# Patient Record
Sex: FEMALE | Race: WHITE | Employment: PART TIME | URBAN - METROPOLITAN AREA
[De-identification: names, ages, dates, MRNs, and addresses within clinical notes are randomized per-mention and may not be internally consistent; named-entity substitution may affect disease eponyms.]

---

## 2017-11-13 ENCOUNTER — TELEPHONE (OUTPATIENT)
Dept: FAMILY MEDICINE | Facility: OTHER | Age: 52
End: 2017-11-13

## 2017-11-13 NOTE — TELEPHONE ENCOUNTER
To: Dr Velasquez  Patient would like to know if you would see her as a new patient.  She was speaking with Angie Atkins and she thought you might consider taking her.  Please return her call @ [phone].  Thank you

## 2017-11-13 NOTE — TELEPHONE ENCOUNTER
Unfortunately, I am not taking new patients at this time.  [facility] are still taking new patients.

## 2017-11-16 ENCOUNTER — TRANSFERRED RECORDS (OUTPATIENT)
Dept: HEALTH INFORMATION MANAGEMENT | Facility: HOSPITAL | Age: 52
End: 2017-11-16

## 2017-11-29 ENCOUNTER — OFFICE VISIT (OUTPATIENT)
Dept: FAMILY MEDICINE | Facility: OTHER | Age: 52
End: 2017-11-29
Attending: NURSE PRACTITIONER
Payer: COMMERCIAL

## 2017-11-29 VITALS
RESPIRATION RATE: 18 BRPM | BODY MASS INDEX: 30.16 KG/M2 | SYSTOLIC BLOOD PRESSURE: 112 MMHG | HEART RATE: 65 BPM | OXYGEN SATURATION: 99 % | TEMPERATURE: 96.9 F | WEIGHT: 181 LBS | DIASTOLIC BLOOD PRESSURE: 76 MMHG | HEIGHT: 65 IN

## 2017-11-29 DIAGNOSIS — M54.50 MIDLINE LOW BACK PAIN WITHOUT SCIATICA, UNSPECIFIED CHRONICITY: ICD-10-CM

## 2017-11-29 DIAGNOSIS — R63.5 WEIGHT GAIN: ICD-10-CM

## 2017-11-29 DIAGNOSIS — Z76.89 ENCOUNTER TO ESTABLISH CARE: Primary | ICD-10-CM

## 2017-11-29 DIAGNOSIS — Z11.59 NEED FOR HEPATITIS C SCREENING TEST: ICD-10-CM

## 2017-11-29 DIAGNOSIS — Z23 NEED FOR VACCINATION: ICD-10-CM

## 2017-11-29 LAB
ANION GAP SERPL CALCULATED.3IONS-SCNC: 12 MMOL/L (ref 3–14)
BUN SERPL-MCNC: 12 MG/DL (ref 7–30)
CALCIUM SERPL-MCNC: 9.2 MG/DL (ref 8.5–10.1)
CHLORIDE SERPL-SCNC: 106 MMOL/L (ref 94–109)
CHOLEST SERPL-MCNC: 222 MG/DL
CO2 SERPL-SCNC: 22 MMOL/L (ref 20–32)
CREAT SERPL-MCNC: 0.58 MG/DL (ref 0.52–1.04)
GFR SERPL CREATININE-BSD FRML MDRD: >90 ML/MIN/1.7M2
GLUCOSE SERPL-MCNC: 85 MG/DL (ref 70–99)
HDLC SERPL-MCNC: 52 MG/DL
LDLC SERPL CALC-MCNC: 143 MG/DL
NONHDLC SERPL-MCNC: 170 MG/DL
POTASSIUM SERPL-SCNC: 4 MMOL/L (ref 3.4–5.3)
SODIUM SERPL-SCNC: 140 MMOL/L (ref 133–144)
TRIGL SERPL-MCNC: 135 MG/DL
TSH SERPL DL<=0.005 MIU/L-ACNC: 1.27 MU/L (ref 0.4–4)

## 2017-11-29 PROCEDURE — 36415 COLL VENOUS BLD VENIPUNCTURE: CPT | Performed by: NURSE PRACTITIONER

## 2017-11-29 PROCEDURE — 99000 SPECIMEN HANDLING OFFICE-LAB: CPT | Performed by: NURSE PRACTITIONER

## 2017-11-29 PROCEDURE — 90471 IMMUNIZATION ADMIN: CPT | Performed by: NURSE PRACTITIONER

## 2017-11-29 PROCEDURE — 86803 HEPATITIS C AB TEST: CPT | Mod: 90 | Performed by: NURSE PRACTITIONER

## 2017-11-29 PROCEDURE — 90715 TDAP VACCINE 7 YRS/> IM: CPT | Performed by: NURSE PRACTITIONER

## 2017-11-29 PROCEDURE — 80061 LIPID PANEL: CPT | Performed by: NURSE PRACTITIONER

## 2017-11-29 PROCEDURE — 84443 ASSAY THYROID STIM HORMONE: CPT | Performed by: NURSE PRACTITIONER

## 2017-11-29 PROCEDURE — 99203 OFFICE O/P NEW LOW 30 MIN: CPT | Mod: 25 | Performed by: NURSE PRACTITIONER

## 2017-11-29 PROCEDURE — 80048 BASIC METABOLIC PNL TOTAL CA: CPT | Performed by: NURSE PRACTITIONER

## 2017-11-29 RX ORDER — BACLOFEN 10 MG/1
5-10 TABLET ORAL 3 TIMES DAILY
Qty: 90 TABLET | Refills: 1 | Status: SHIPPED | OUTPATIENT
Start: 2017-11-29 | End: 2018-01-08

## 2017-11-29 ASSESSMENT — ANXIETY QUESTIONNAIRES
4. TROUBLE RELAXING: NOT AT ALL
7. FEELING AFRAID AS IF SOMETHING AWFUL MIGHT HAPPEN: NOT AT ALL
6. BECOMING EASILY ANNOYED OR IRRITABLE: SEVERAL DAYS
1. FEELING NERVOUS, ANXIOUS, OR ON EDGE: NOT AT ALL
2. NOT BEING ABLE TO STOP OR CONTROL WORRYING: NOT AT ALL
IF YOU CHECKED OFF ANY PROBLEMS ON THIS QUESTIONNAIRE, HOW DIFFICULT HAVE THESE PROBLEMS MADE IT FOR YOU TO DO YOUR WORK, TAKE CARE OF THINGS AT HOME, OR GET ALONG WITH OTHER PEOPLE: NOT DIFFICULT AT ALL
GAD7 TOTAL SCORE: 2
5. BEING SO RESTLESS THAT IT IS HARD TO SIT STILL: NOT AT ALL
3. WORRYING TOO MUCH ABOUT DIFFERENT THINGS: SEVERAL DAYS

## 2017-11-29 ASSESSMENT — PATIENT HEALTH QUESTIONNAIRE - PHQ9: SUM OF ALL RESPONSES TO PHQ QUESTIONS 1-9: 0

## 2017-11-29 ASSESSMENT — PAIN SCALES - GENERAL: PAINLEVEL: NO PAIN (0)

## 2017-11-29 NOTE — NURSING NOTE
"Chief Complaint   Patient presents with     Establish Care       Initial /76 (BP Location: Left arm, Patient Position: Chair, Cuff Size: Adult Large)  Pulse 65  Temp 96.9  F (36.1  C) (Tympanic)  Resp 18  Ht 5' 4.5\" (1.638 m)  Wt 181 lb (82.1 kg)  SpO2 99%  BMI 30.59 kg/m2 Estimated body mass index is 30.59 kg/(m^2) as calculated from the following:    Height as of this encounter: 5' 4.5\" (1.638 m).    Weight as of this encounter: 181 lb (82.1 kg).  Medication Reconciliation: complete   Pamela M Lechevalier LPN      "

## 2017-11-29 NOTE — PROGRESS NOTES
SUBJECTIVE:   Maria A Saldaña is a 52 year old female who presents to clinic today for the following health issues:  Chief Complaint   Patient presents with     Establish Care     Maria A presents today to [facility] care.  She had been following with Dr Alvarez.      She had her mammogram earlier this fall, pap is due next year and denies history of abnormal pap.      She is overall healthy and does not have any new concerns other than intermittent low back pain.  She takes NSAIDS with some relief but does notice pain with standing for long periods, or sitting for long periods.      She believes she is starting menopause, periods have become irregular, weight gain of 20 in the past 2 years.        Problem list and histories reviewed & adjusted, as indicated.  Additional history: as documented    There is no problem list on file for this patient.    Past Surgical History:   Procedure Laterality Date     GYN SURGERY  1989    c section     SOFT TISSUE SURGERY      bunnon on foot       Social History   Substance Use Topics     Smoking status: Never Smoker     Smokeless tobacco: Never Used     Alcohol use Not on file      Comment: 1 drink occasonaly      Family History   Problem Relation Age of Onset     Hypertension Mother      Hyperlipidemia Mother      Coronary Artery Disease Mother      Hypertension Father      Hyperlipidemia Father      Hypertension Brother      Hypertension Brother      Hypertension Brother          No current outpatient prescriptions on file.     Allergies   Allergen Reactions     Sulfa Drugs Swelling     throat     No lab results found.   BP Readings from Last 3 Encounters:   11/29/17 112/76    Wt Readings from Last 3 Encounters:   11/29/17 181 lb (82.1 kg)                Reviewed and updated as needed this visit by clinical staffTobacco  Allergies  Meds  Problems  Med Hx  Surg Hx  Fam Hx  Soc Hx        Reviewed and updated as needed this visit by Provider         ROS:  Constitutional,  "HEENT, cardiovascular, pulmonary, gi and gu systems are negative, except as otherwise noted.      OBJECTIVE:   /76 (BP Location: Left arm, Patient Position: Chair, Cuff Size: Adult Large)  Pulse 65  Temp 96.9  F (36.1  C) (Tympanic)  Resp 18  Ht 5' 4.5\" (1.638 m)  Wt 181 lb (82.1 kg)  SpO2 99%  BMI 30.59 kg/m2  Body mass index is 30.59 kg/(m^2).  GENERAL: healthy, alert and no distress  HENT: ear canals and TM's normal, nose and mouth without ulcers or lesions  NECK: no adenopathy, no asymmetry, masses, or scars, thyroid normal to palpation and no carotid bruits  RESP: lungs clear to auscultation - no rales, rhonchi or wheezes  CV: regular rate and rhythm, normal S1 S2, no S3 or S4, no murmur, click or rub, no peripheral edema and peripheral pulses strong  MS: no gross musculoskeletal defects noted, no edema  PSYCH: mentation appears normal, affect normal/bright      ASSESSMENT/PLAN:       1. Encounter to establish care    2. Weight gain  symptomatic  - TSH with free T4 reflex  - Basic metabolic panel  - Lipid Profile    3. Midline low back pain without sciatica, unspecified chronicity  Continue ibuprofen or naproxen as needed  - baclofen (LIORESAL) 10 MG tablet; Take 0.5-1 tablets (5-10 mg) by mouth 3 times daily  Dispense: 90 tablet; Refill: 1    4. Need for hepatitis C screening test  routine  - Hepatitis C Screen Reflex to HCV RNA Quant and Genotype    Declined flu vaccine  Updated adacel daily    FUTURE APPOINTMENTS:       - Follow-up visit as needed    Deanna Shine NP  [facility]    "

## 2017-11-29 NOTE — MR AVS SNAPSHOT
After Visit Summary   11/29/2017    Maria A Saldaña    MRN: [id number]           Patient Information     Date Of Birth          1965        Visit Information        Provider Department      11/29/2017 9:00 AM Deanna Shine NP [facility]        Today's Diagnoses     Encounter to establish care    -  1    Weight gain        Midline low back pain without sciatica, unspecified chronicity        Need for hepatitis C screening test        Need for vaccination          Care Instructions      ASSESSMENT/PLAN:       1. Encounter to establish care    2. Weight gain  symptomatic  - TSH with free T4 reflex  - Basic metabolic panel  - Lipid Profile    3. Midline low back pain without sciatica, unspecified chronicity  Continue ibuprofen or naproxen as needed  - baclofen (LIORESAL) 10 MG tablet; Take 0.5-1 tablets (5-10 mg) by mouth 3 times daily  Dispense: 90 tablet; Refill: 1    4. Need for hepatitis C screening test  routine  - Hepatitis C Screen Reflex to HCV RNA Quant and Genotype    Declined flu vaccine  Updated boostrix daily    FUTURE APPOINTMENTS:       - Follow-up visit as needed    Deanna Shine NP  [facility]              Follow-ups after your visit        Follow-up notes from your care team     Return if symptoms worsen or fail to improve.      Your next 10 appointments already scheduled     Jan 08, 2018  9:00 AM CST   (Arrive by 8:45 AM)   Pre-Op physical with Deanna Shine NP   [facility] ([facility] )    [address]  [place] [address]   [phone]              Who to contact     If you have questions or need follow up information about today's clinic visit or your schedule please contact [facility] directly at [phone].  Normal or non-critical lab and imaging results will be communicated to you by MyChart, letter or phone within 4 business days after the clinic  "has received the results. If you do not hear from us within 7 days, please contact the clinic through [employer] or phone. If you have a critical or abnormal lab result, we will notify you by phone as soon as possible.  Submit refill requests through [employer] or call your pharmacy and they will forward the refill request to us. Please allow 3 business days for your refill to be completed.          Additional Information About Your Visit        [employer] Information     [employer] lets you send messages to your doctor, view your test results, renew your prescriptions, schedule appointments and more. To sign up, go to [email or web address] . Click on \"Log in\" on the left side of the screen, which will take you to the Welcome page. Then click on \"Sign up Now\" on the right side of the page.     You will be asked to enter the access code listed below, as well as some personal information. Please follow the directions to create your username and password.     Your access code is: [email or web address]  Expires: 2018  2:18 PM     Your access code will  in 90 days. If you need help or a new code, please call your [facility] clinic or [phone].        Care EveryWhere ID     This is your Care EveryWhere ID. This could be used by other organizations to access your [facility] medical records  [id number]        Your Vitals Were     Pulse Temperature Respirations Height Pulse Oximetry BMI (Body Mass Index)    65 96.9  F (36.1  C) (Tympanic) 18 5' 4.5\" (1.638 m) 99% 30.59 kg/m2       Blood Pressure from Last 3 Encounters:   17 112/76    Weight from Last 3 Encounters:   17 181 lb (82.1 kg)              We Performed the Following     1st  Administration  [10379]     Basic metabolic panel     Hepatitis C Screen Reflex to HCV RNA Quant and Genotype     Lipid Profile     TDAP VACCINE (ADACEL) [14348.002]     TSH with free T4 reflex          Today's Medication Changes          These changes are accurate as of: 17  2:18 " PM.  If you have any questions, ask your nurse or doctor.               Start taking these medicines.        Dose/Directions    baclofen 10 MG tablet   Commonly known as:  LIORESAL   Used for:  Midline low back pain without sciatica, unspecified chronicity   Started by:  Deanna Shine NP        Dose:  5-10 mg   Take 0.5-1 tablets (5-10 mg) by mouth 3 times daily   Quantity:  90 tablet   Refills:  1            Where to get your medicines      These medications were sent to [place] [address] [place] [address] [address]     Phone:  [phone]     baclofen 10 MG tablet                Primary Care Provider Office Phone # Fax #    Deanna Shine [phone] [address] [facility] [address]        Equal Access to Services     [facility] : Hadii ayesha vivas hadasho Soomaali, waaxda luqadaha, qaybta kaalmada adeegyada, lakesha mehta . So [facility] [phone].    ATENCIÓN: Si habla español, tiene a lopez disposición servicios gratuitos de asistencia lingüística. Llame al [phone].    We comply with applicable federal civil rights laws and [place] laws. We do not discriminate on the basis of race, color, national origin, age, disability, sex, sexual orientation, or gender identity.            Thank you!     Thank you for choosing [facility]  for your care. Our goal is always to provide you with excellent care. Hearing back from our patients is one way we can continue to improve our services. Please take a few minutes to complete the written survey that you may receive in the mail after your visit with us. Thank you!             Your Updated Medication List - Protect others around you: Learn how to safely use, store and throw away your medicines at www.disposemymeds.org.          This list is accurate as of: 11/29/17  2:18 PM.  Always use your most recent med list.                    Brand Name Dispense Instructions for use Diagnosis    baclofen 10 MG tablet    LIORESAL    90 tablet    Take 0.5-1 tablets (5-10 mg) by mouth 3 times daily    Midline low back pain without sciatica, unspecified chronicity

## 2017-11-30 LAB — HCV AB SERPL QL IA: NONREACTIVE

## 2017-11-30 ASSESSMENT — ANXIETY QUESTIONNAIRES: GAD7 TOTAL SCORE: 2

## 2018-01-08 ENCOUNTER — OFFICE VISIT (OUTPATIENT)
Dept: FAMILY MEDICINE | Facility: OTHER | Age: 53
End: 2018-01-08
Attending: NURSE PRACTITIONER
Payer: COMMERCIAL

## 2018-01-08 VITALS
HEART RATE: 71 BPM | WEIGHT: 181 LBS | SYSTOLIC BLOOD PRESSURE: 106 MMHG | TEMPERATURE: 97.7 F | RESPIRATION RATE: 16 BRPM | BODY MASS INDEX: 30.16 KG/M2 | HEIGHT: 65 IN | DIASTOLIC BLOOD PRESSURE: 74 MMHG | OXYGEN SATURATION: 98 %

## 2018-01-08 DIAGNOSIS — M21.612 BUNION, LEFT: ICD-10-CM

## 2018-01-08 DIAGNOSIS — Z01.818 PREOP GENERAL PHYSICAL EXAM: Primary | ICD-10-CM

## 2018-01-08 DIAGNOSIS — Z00.00 HEALTH CARE MAINTENANCE: ICD-10-CM

## 2018-01-08 LAB
ALBUMIN SERPL-MCNC: 3.7 G/DL (ref 3.4–5)
ALP SERPL-CCNC: 89 U/L (ref 40–150)
ALT SERPL W P-5'-P-CCNC: 27 U/L (ref 0–50)
ANION GAP SERPL CALCULATED.3IONS-SCNC: 8 MMOL/L (ref 3–14)
AST SERPL W P-5'-P-CCNC: 17 U/L (ref 0–45)
BASOPHILS # BLD AUTO: 0 10E9/L (ref 0–0.2)
BASOPHILS NFR BLD AUTO: 0.2 %
BILIRUB SERPL-MCNC: 0.3 MG/DL (ref 0.2–1.3)
BUN SERPL-MCNC: 15 MG/DL (ref 7–30)
CALCIUM SERPL-MCNC: 9.1 MG/DL (ref 8.5–10.1)
CHLORIDE SERPL-SCNC: 106 MMOL/L (ref 94–109)
CO2 SERPL-SCNC: 27 MMOL/L (ref 20–32)
CREAT SERPL-MCNC: 0.66 MG/DL (ref 0.52–1.04)
DIFFERENTIAL METHOD BLD: NORMAL
EOSINOPHIL # BLD AUTO: 0.2 10E9/L (ref 0–0.7)
EOSINOPHIL NFR BLD AUTO: 1.9 %
ERYTHROCYTE [DISTWIDTH] IN BLOOD BY AUTOMATED COUNT: 12.4 % (ref 10–15)
GFR SERPL CREATININE-BSD FRML MDRD: >90 ML/MIN/1.7M2
GLUCOSE SERPL-MCNC: 82 MG/DL (ref 70–99)
HCT VFR BLD AUTO: 39.5 % (ref 35–47)
HGB BLD-MCNC: 13.7 G/DL (ref 11.7–15.7)
LYMPHOCYTES # BLD AUTO: 2.8 10E9/L (ref 0.8–5.3)
LYMPHOCYTES NFR BLD AUTO: 31.8 %
MCH RBC QN AUTO: 31 PG (ref 26.5–33)
MCHC RBC AUTO-ENTMCNC: 34.7 G/DL (ref 31.5–36.5)
MCV RBC AUTO: 89 FL (ref 78–100)
MONOCYTES # BLD AUTO: 0.7 10E9/L (ref 0–1.3)
MONOCYTES NFR BLD AUTO: 7.3 %
NEUTROPHILS # BLD AUTO: 5.3 10E9/L (ref 1.6–8.3)
NEUTROPHILS NFR BLD AUTO: 58.8 %
PLATELET # BLD AUTO: 362 10E9/L (ref 150–450)
POTASSIUM SERPL-SCNC: 4.3 MMOL/L (ref 3.4–5.3)
PROT SERPL-MCNC: 7.4 G/DL (ref 6.8–8.8)
RBC # BLD AUTO: 4.42 10E12/L (ref 3.8–5.2)
SODIUM SERPL-SCNC: 141 MMOL/L (ref 133–144)
WBC # BLD AUTO: 8.9 10E9/L (ref 4–11)

## 2018-01-08 PROCEDURE — 36415 COLL VENOUS BLD VENIPUNCTURE: CPT | Performed by: NURSE PRACTITIONER

## 2018-01-08 PROCEDURE — 93000 ELECTROCARDIOGRAM COMPLETE: CPT | Performed by: INTERNAL MEDICINE

## 2018-01-08 PROCEDURE — 99214 OFFICE O/P EST MOD 30 MIN: CPT | Performed by: NURSE PRACTITIONER

## 2018-01-08 PROCEDURE — 80053 COMPREHEN METABOLIC PANEL: CPT | Performed by: NURSE PRACTITIONER

## 2018-01-08 PROCEDURE — 85025 COMPLETE CBC W/AUTO DIFF WBC: CPT | Performed by: NURSE PRACTITIONER

## 2018-01-08 RX ORDER — CRANBERRY FRUIT EXTRACT 200 MG
1 CAPSULE ORAL DAILY
Qty: 90 CAPSULE | Refills: 3 | COMMUNITY
Start: 2018-01-08 | End: 2018-11-30

## 2018-01-08 RX ORDER — OMEGA-3 FATTY ACIDS/FISH OIL 300-1000MG
1 CAPSULE ORAL DAILY
Qty: 90 CAPSULE | Refills: 3 | COMMUNITY
Start: 2018-01-08 | End: 2018-11-30

## 2018-01-08 RX ORDER — MULTIPLE VITAMINS W/ MINERALS TAB 9MG-400MCG
1 TAB ORAL DAILY
Qty: 100 TABLET | Refills: 3 | COMMUNITY
Start: 2018-01-08 | End: 2018-11-30

## 2018-01-08 ASSESSMENT — PATIENT HEALTH QUESTIONNAIRE - PHQ9: SUM OF ALL RESPONSES TO PHQ QUESTIONS 1-9: 0

## 2018-01-08 ASSESSMENT — ANXIETY QUESTIONNAIRES
7. FEELING AFRAID AS IF SOMETHING AWFUL MIGHT HAPPEN: NOT AT ALL
6. BECOMING EASILY ANNOYED OR IRRITABLE: NOT AT ALL
1. FEELING NERVOUS, ANXIOUS, OR ON EDGE: NOT AT ALL
GAD7 TOTAL SCORE: 0
2. NOT BEING ABLE TO STOP OR CONTROL WORRYING: NOT AT ALL
IF YOU CHECKED OFF ANY PROBLEMS ON THIS QUESTIONNAIRE, HOW DIFFICULT HAVE THESE PROBLEMS MADE IT FOR YOU TO DO YOUR WORK, TAKE CARE OF THINGS AT HOME, OR GET ALONG WITH OTHER PEOPLE: NOT DIFFICULT AT ALL
5. BEING SO RESTLESS THAT IT IS HARD TO SIT STILL: NOT AT ALL
3. WORRYING TOO MUCH ABOUT DIFFERENT THINGS: NOT AT ALL
4. TROUBLE RELAXING: NOT AT ALL

## 2018-01-08 ASSESSMENT — PAIN SCALES - GENERAL: PAINLEVEL: NO PAIN (0)

## 2018-01-08 NOTE — NURSING NOTE
"Chief Complaint   Patient presents with     Pre-Op Exam     bunion left foot [facility] [place]       Initial /74 (BP Location: Right arm, Patient Position: Chair, Cuff Size: Adult Large)  Pulse 71  Temp 97.7  F (36.5  C) (Tympanic)  Resp 16  Ht 5' 4.5\" (1.638 m)  Wt 181 lb (82.1 kg)  SpO2 98%  BMI 30.59 kg/m2 Estimated body mass index is 30.59 kg/(m^2) as calculated from the following:    Height as of this encounter: 5' 4.5\" (1.638 m).    Weight as of this encounter: 181 lb (82.1 kg).  Medication Reconciliation: complete   Pamela M Lechevalier LPN      "

## 2018-01-08 NOTE — PROGRESS NOTES
[facility]  [address]  [facility] [address]  [phone]  Dept: [phone]    PRE-OP EVALUATION:  Today's date: 2018    Maria A Saldaña (: 1965) presents for pre-operative evaluation assessment as requested by Dr. Barrera Messina.  She requires evaluation and anesthesia risk assessment prior to undergoing surgery/procedure for treatment of left foot pain due to bunion.    Proposed procedure: Bunion removal left foot     Date of Surgery/ Procedure: 17  Time of Surgery/ Procedure: to be determined   Hospital/Surgical Facility: [place], [place]     Primary Physician: Deanna Shine  Type of Anesthesia Anticipated: to be determined    Patient has a Health Care Directive or Living Will:  NO    1. NO - Do you have a history of heart attack, stroke, stent, bypass or surgery on an artery in the head, neck, heart or legs?  2. NO - Do you ever have any pain or discomfort in your chest?  3. NO - Do you have a history of  Heart Failure?  4. NO - Are you troubled by shortness of breath when: walking on the level, up a slight hill or at night?  5. NO - Do you currently have a cold, bronchitis or other respiratory infection?  6. NO - Do you have a cough, shortness of breath or wheezing?  7. NO - Do you sometimes get pains in the calves of your legs when you walk?  8. YES - Do you or anyone in your family have previous history of blood clots? Father with factor 5; Maria A has been tested and she is negative.    9. NO - Do you or does anyone in your family have a serious bleeding problem such as prolonged bleeding following surgeries or cuts?  10. NO - Have you ever had problems with anemia or been told to take iron pills?  11. NO - Have you had any abnormal blood loss such as black, tarry or bloody stools, or abnormal vaginal bleeding?  12. NO - Have you ever had a blood transfusion?  13. YES - Have you or any of your relatives ever had problems with  anesthesia? Mother, several years ago.    14. NO - Do you have sleep apnea, excessive snoring or daytime drowsiness?  15. NO - Do you have any prosthetic heart valves?  16. NO - Do you have prosthetic joints?  17. NO - Is there any chance that you may be pregnant?        HPI:                                                      Brief HPI related to upcoming procedure: chronic left foot pain due to bunion.  Symptoms are now interfering with daily activities.  The decision has been made to proceed with surgical correction.        She is otherwise feeling well and does not have any new concerns today.      MEDICAL HISTORY:                                                    There are no active problems to display for this patient.     No past medical history on file.  Past Surgical History:   Procedure Laterality Date     COLONOSCOPY - HIM SCAN  04/13/2012     GYN SURGERY  1989    c section     SOFT TISSUE SURGERY      bunnon on foot     Current Outpatient Prescriptions   Medication Sig Dispense Refill     multivitamin, therapeutic with minerals (MULTI-VITAMIN) TABS tablet Take 1 tablet by mouth daily 100 tablet 3     magnesium oxide (MAG-OX) 400 (241.3 MG) MG tablet Take 1 tablet (400 mg) by mouth daily 60 tablet 3     Probiotic Product (ACIDOPHILUS PROBIOTIC BLEND) CAPS Take 1 each by mouth daily 90 capsule 3     omega 3 1000 MG CAPS Take 1 g by mouth daily 90 capsule 3     OTC products: None, except as noted above, no recent use of OTC ASA, NSAIDS or Steroids, no use of herbal medications or other supplements and herbals and vitamins - she does use Plexus vitamins.      Allergies   Allergen Reactions     Sulfa Drugs Swelling     throat      Latex Allergy: NO    Social History   Substance Use Topics     Smoking status: Never Smoker     Smokeless tobacco: Never Used     Alcohol use Not on file      Comment: 1 drink occasonaly      History   Drug Use No       REVIEW OF SYSTEMS:                                             "        C: NEGATIVE for fever, chills, change in weight  I: NEGATIVE for worrisome rashes, moles or lesions  E: NEGATIVE for vision changes or irritation  E/M: NEGATIVE for ear, mouth and throat problems  R: NEGATIVE for significant cough or SOB  CV: NEGATIVE for chest pain, palpitations or peripheral edema  GI: NEGATIVE for nausea, abdominal pain, heartburn, or change in bowel habits  : NEGATIVE for frequency, dysuria, or hematuria  MUSCULOSKELETAL:left foot pain  N: NEGATIVE for weakness, dizziness or paresthesias  E: NEGATIVE for temperature intolerance, skin/hair changes  H: NEGATIVE for bleeding problems  P: NEGATIVE for changes in mood or affect    EXAM:                                                    /74 (BP Location: Right arm, Patient Position: Chair, Cuff Size: Adult Large)  Pulse 71  Temp 97.7  F (36.5  C) (Tympanic)  Resp 16  Ht 5' 4.5\" (1.638 m)  Wt 181 lb (82.1 kg)  SpO2 98%  BMI 30.59 kg/m2    GENERAL APPEARANCE: healthy, alert and no distress     EYES: EOMI, PERRL     HENT: ear canals and TM's normal and nose and mouth without ulcers or lesions     NECK: no adenopathy, no asymmetry, masses, or scars and thyroid normal to palpation     RESP: lungs clear to auscultation - no rales, rhonchi or wheezes     CV: regular rates and rhythm, normal S1 S2, no S3 or S4 and no murmur, click or rub     ABDOMEN:  soft, nontender, no HSM or masses and bowel sounds normal     MS: extremities normal- no gross deformities noted, no evidence of inflammation in joints, FROM in all extremities.     SKIN: no suspicious lesions or rashes     NEURO: Normal strength and tone, sensory exam grossly normal, mentation intact and speech normal     PSYCH: mentation appears normal. and affect normal/bright    DIAGNOSTICS:                                                      Results for orders placed or performed in visit on 01/08/18   CBC with platelets differential   Result Value Ref Range    WBC 8.9 4.0 - 11.0 " 10e9/L    RBC Count 4.42 3.8 - 5.2 10e12/L    Hemoglobin 13.7 11.7 - 15.7 g/dL    Hematocrit 39.5 35.0 - 47.0 %    MCV 89 78 - 100 fl    MCH 31.0 26.5 - 33.0 pg    MCHC 34.7 31.5 - 36.5 g/dL    RDW 12.4 10.0 - 15.0 %    Platelet Count 362 150 - 450 10e9/L    Diff Method Automated Method     % Neutrophils 58.8 %    % Lymphocytes 31.8 %    % Monocytes 7.3 %    % Eosinophils 1.9 %    % Basophils 0.2 %    Absolute Neutrophil 5.3 1.6 - 8.3 10e9/L    Absolute Lymphocytes 2.8 0.8 - 5.3 10e9/L    Absolute Monocytes 0.7 0.0 - 1.3 10e9/L    Absolute Eosinophils 0.2 0.0 - 0.7 10e9/L    Absolute Basophils 0.0 0.0 - 0.2 10e9/L   Comprehensive metabolic panel   Result Value Ref Range    Sodium 141 133 - 144 mmol/L    Potassium 4.3 3.4 - 5.3 mmol/L    Chloride 106 94 - 109 mmol/L    Carbon Dioxide 27 20 - 32 mmol/L    Anion Gap 8 3 - 14 mmol/L    Glucose 82 70 - 99 mg/dL    Urea Nitrogen 15 7 - 30 mg/dL    Creatinine 0.66 0.52 - 1.04 mg/dL    GFR Estimate >90 >60 mL/min/1.7m2    GFR Estimate If Black >90 >60 mL/min/1.7m2    Calcium 9.1 8.5 - 10.1 mg/dL    Bilirubin Total 0.3 0.2 - 1.3 mg/dL    Albumin 3.7 3.4 - 5.0 g/dL    Protein Total 7.4 6.8 - 8.8 g/dL    Alkaline Phosphatase 89 40 - 150 U/L    ALT 27 0 - 50 U/L    AST 17 0 - 45 U/L         Recent Labs   Lab Test  11/29/17   0959   NA  140   POTASSIUM  4.0   CR  0.58           EKG Interpretation:      Interpreted by Deanna Shine    Symptoms at time of EKG: None   Rhythm: Normal sinus   Rate: Normal, 63  Axis: Normal  Ectopy: None  Conduction: Normal  ST Segments/ T Waves: No ST-T wave changes and No acute ischemic changes  Q Waves: None  Comparison to prior: No old EKG available    Clinical Impression: normal EKG      IMPRESSION:                                                    Reason for surgery/procedure: lft foot pain due to bunion  Diagnosis/reason for consult: anesthesia risk assessment    The proposed surgical procedure is considered INTERMEDIATE  risk.    REVISED CARDIAC RISK INDEX  The patient has the following serious cardiovascular risks for perioperative complications such as (MI, PE, VFib and 3  AV Block):  No serious cardiac risks  INTERPRETATION: 0 risks: Class I (very low risk - 0.4% complication rate)    The patient has the following additional risks for perioperative complications:  No identified additional risks      ICD-10-CM    1. Preop general physical exam Z01.818 CBC with platelets differential     Comprehensive metabolic panel     EKG 12-lead complete w/read - (Clinic Performed)   2. Bunion, left M21.612    3. Health care maintenance Z00.00 multivitamin, therapeutic with minerals (MULTI-VITAMIN) TABS tablet     magnesium oxide (MAG-OX) 400 (241.3 MG) MG tablet     Probiotic Product (ACIDOPHILUS PROBIOTIC BLEND) CAPS     omega 3 1000 MG CAPS       RECOMMENDATIONS:                                                        Cardiovascular Risk  Performs 4 METs exercise without symptoms (Climb a flight of stairs and Walk on level ground at 15 minutes per mile (4 miles/hour)) .       APPROVAL GIVEN to proceed with proposed procedure, without further diagnostic evaluation       Signed Electronically by: Deanna Shine NP    Copy of this evaluation report is provided to requesting physician.    [facility] Preop Guidelines

## 2018-01-08 NOTE — MR AVS SNAPSHOT
After Visit Summary   1/8/2018    Maria A Saldaña    MRN: [id number]           Patient Information     Date Of Birth          1965        Visit Information        Provider Department      1/8/2018 9:00 AM Deanna Shine NP [facility]        Today's Diagnoses     Preop general physical exam    -  1    [place] left        [facility]          Care Instructions      Before Your Surgery      Call your surgeon if there is any change in your health. This includes signs of a cold or flu (such as a sore throat, runny nose, cough, rash or fever).    Do not smoke, drink alcohol or take over the counter medicine (unless your surgeon or primary care doctor tells you to) for the 24 hours before and after surgery.    If you take prescribed drugs: Follow your doctor s orders about which medicines to take and which to stop until after surgery.    Eating and drinking prior to surgery: follow the instructions from your surgeon    Take a shower or bath the night before surgery. Use the soap your surgeon gave you to gently clean your skin. If you do not have soap from your surgeon, use your regular soap. Do not shave or scrub the surgery site.  Wear clean pajamas and have clean sheets on your bed.           Follow-ups after your visit        Who to contact     If you have questions or need follow up information about today's clinic visit or your schedule please contact [facility] directly at [phone].  Normal or non-critical lab and imaging results will be communicated to you by MyChart, letter or phone within 4 business days after the clinic has received the results. If you do not hear from us within 7 days, please contact the clinic through MyChart or phone. If you have a critical or abnormal lab result, we will notify you by phone as soon as possible.  Submit refill requests through [employer] or call your pharmacy and they will forward the refill request to us.  "Please allow 3 business days for your refill to be completed.          Additional Information About Your Visit        MyChart Information     [employer] lets you send messages to your doctor, view your test results, renew your prescriptions, schedule appointments and more. To sign up, go to [email or web address] . Click on \"Log in\" on the left side of the screen, which will take you to the Welcome page. Then click on \"Sign up Now\" on the right side of the page.     You will be asked to enter the access code listed below, as well as some personal information. Please follow the directions to create your username and password.     Your access code is: [email or web address]  Expires: 2018  2:18 PM     Your access code will  in 90 days. If you need help or a new code, please call your [facility] clinic or [phone].        Care EveryWhere ID     This is your [facility] EveryWhere ID. This could be used by other organizations to access your [facility] medical records  [id number]        Your Vitals Were     Pulse Temperature Respirations Height Pulse Oximetry BMI (Body Mass Index)    71 97.7  F (36.5  C) (Tympanic) 16 5' 4.5\" (1.638 m) 98% 30.59 kg/m2       Blood Pressure from Last 3 Encounters:   18 106/74   17 112/76    Weight from Last 3 Encounters:   18 181 lb (82.1 kg)   17 181 lb (82.1 kg)              We Performed the Following     CBC with platelets differential     Comprehensive metabolic panel     EKG 12-lead complete w/read - (Clinic Performed)        Primary Care Provider Office Phone # Fax #    Deanna KJ Shine [phone] [address] [place] [address]        Equal Access to Services     ELVIA ARGUETA : Alina Reed, rosa kirkpatrick, qalakesha hernandez. So [facility] [phone].    ATENCIÓN: Si habla español, tiene a lopez disposición servicios gratuitos de asistencia lingüística. " Jessica garcia [phone].    We comply with applicable federal civil rights laws and [place] laws. We do not discriminate on the basis of race, color, national origin, age, disability, sex, sexual orientation, or gender identity.            Thank you!     Thank you for choosing [facility]  for your care. Our goal is always to provide you with excellent care. Hearing back from our patients is one way we can continue to improve our services. Please take a few minutes to complete the written survey that you may receive in the mail after your visit with us. Thank you!             Your Updated Medication List - Protect others around you: Learn how to safely use, store and throw away your medicines at www.disposemymeds.org.          This list is accurate as of: 1/8/18  9:32 AM.  Always use your most recent med list.                   Brand Name Dispense Instructions for use Diagnosis    ACIDOPHILUS PROBIOTIC BLEND Caps     90 capsule    Take 1 each by mouth daily    Health care maintenance       magnesium oxide 400 (241.3 MG) MG tablet    MAG-OX    60 tablet    Take 1 tablet (400 mg) by mouth daily    Health care maintenance       Multi-vitamin Tabs tablet     100 tablet    Take 1 tablet by mouth daily    Health care maintenance       omega 3 1000 MG Caps     90 capsule    Take 1 g by mouth daily    Health care maintenance

## 2018-01-09 ASSESSMENT — ANXIETY QUESTIONNAIRES: GAD7 TOTAL SCORE: 0

## 2018-01-11 NOTE — PROGRESS NOTES
Faxed PreOp 1/8/18, ECG and Labs to:  Dr. Messina  [facility]  Ph. [phone]  Fx. [phone]

## 2018-02-14 ENCOUNTER — OFFICE VISIT (OUTPATIENT)
Dept: FAMILY MEDICINE | Facility: OTHER | Age: 53
End: 2018-02-14
Attending: NURSE PRACTITIONER
Payer: COMMERCIAL

## 2018-02-14 VITALS
BODY MASS INDEX: 28.66 KG/M2 | OXYGEN SATURATION: 98 % | DIASTOLIC BLOOD PRESSURE: 78 MMHG | HEART RATE: 80 BPM | WEIGHT: 172 LBS | HEIGHT: 65 IN | TEMPERATURE: 98.9 F | RESPIRATION RATE: 16 BRPM | SYSTOLIC BLOOD PRESSURE: 124 MMHG

## 2018-02-14 DIAGNOSIS — J06.9 VIRAL URI WITH COUGH: ICD-10-CM

## 2018-02-14 DIAGNOSIS — R05.9 COUGH: ICD-10-CM

## 2018-02-14 DIAGNOSIS — R68.89 FLU-LIKE SYMPTOMS: Primary | ICD-10-CM

## 2018-02-14 LAB
FLUAV+FLUBV AG SPEC QL: NEGATIVE
FLUAV+FLUBV AG SPEC QL: NEGATIVE
SPECIMEN SOURCE: NORMAL

## 2018-02-14 PROCEDURE — 87804 INFLUENZA ASSAY W/OPTIC: CPT | Mod: 59 | Performed by: NURSE PRACTITIONER

## 2018-02-14 PROCEDURE — 99213 OFFICE O/P EST LOW 20 MIN: CPT | Performed by: NURSE PRACTITIONER

## 2018-02-14 RX ORDER — ALBUTEROL SULFATE 90 UG/1
2 AEROSOL, METERED RESPIRATORY (INHALATION) EVERY 4 HOURS PRN
Qty: 1 INHALER | Refills: 0 | Status: SHIPPED | OUTPATIENT
Start: 2018-02-14 | End: 2018-04-25

## 2018-02-14 RX ORDER — CODEINE PHOSPHATE AND GUAIFENESIN 10; 100 MG/5ML; MG/5ML
1-2 SOLUTION ORAL EVERY 6 HOURS PRN
Qty: 180 ML | Refills: 0 | Status: SHIPPED | OUTPATIENT
Start: 2018-02-14 | End: 2018-04-25

## 2018-02-14 ASSESSMENT — ANXIETY QUESTIONNAIRES
6. BECOMING EASILY ANNOYED OR IRRITABLE: NOT AT ALL
7. FEELING AFRAID AS IF SOMETHING AWFUL MIGHT HAPPEN: NOT AT ALL
4. TROUBLE RELAXING: NOT AT ALL
3. WORRYING TOO MUCH ABOUT DIFFERENT THINGS: NOT AT ALL
2. NOT BEING ABLE TO STOP OR CONTROL WORRYING: NOT AT ALL
GAD7 TOTAL SCORE: 0
IF YOU CHECKED OFF ANY PROBLEMS ON THIS QUESTIONNAIRE, HOW DIFFICULT HAVE THESE PROBLEMS MADE IT FOR YOU TO DO YOUR WORK, TAKE CARE OF THINGS AT HOME, OR GET ALONG WITH OTHER PEOPLE: NOT DIFFICULT AT ALL
1. FEELING NERVOUS, ANXIOUS, OR ON EDGE: NOT AT ALL
5. BEING SO RESTLESS THAT IT IS HARD TO SIT STILL: NOT AT ALL

## 2018-02-14 ASSESSMENT — PAIN SCALES - GENERAL: PAINLEVEL: EXTREME PAIN (8)

## 2018-02-14 NOTE — NURSING NOTE
"Chief Complaint   Patient presents with     Cough       Initial /78 (BP Location: Left arm, Patient Position: Chair, Cuff Size: Adult Large)  Pulse 80  Temp 98.9  F (37.2  C) (Tympanic)  Resp 16  Ht 5' 4.5\" (1.638 m)  Wt 172 lb (78 kg)  SpO2 98%  BMI 29.07 kg/m2 Estimated body mass index is 29.07 kg/(m^2) as calculated from the following:    Height as of this encounter: 5' 4.5\" (1.638 m).    Weight as of this encounter: 172 lb (78 kg).  Medication Reconciliation: complete   Pamela M Lechevalier LPN      "

## 2018-02-14 NOTE — PROGRESS NOTES
SUBJECTIVE:   Maria A Saldñaa is a 52 year old female who presents to clinic today for the following health issues:  Cough     Acute Illness   Acute illness concerns: cough  Onset: today    Fever: YES- on sunday    Chills/Sweats: YES    Headache (location?): no     Sinus Pressure:no    Conjunctivitis:  no    Ear Pain: no    Rhinorrhea: YES    Congestion: YES    Sore Throat: no      Cough: YES-productive of yellow sputum    Wheeze: YES    Decreased Appetite: YES    Nausea: no     Vomiting: no     Diarrhea:  YES    Dysuria/Freq.: no     Fatigue/Achiness: yes    Sick/Strep Exposure: no      Therapies Tried and outcome: otc medications         Problem list and histories reviewed & adjusted, as indicated.  Additional history: as documented    There is no problem list on file for this patient.    Past Surgical History:   Procedure Laterality Date     COLONOSCOPY - HIM SCAN  04/13/2012     GYN SURGERY  1989    c section     SOFT TISSUE SURGERY      bunnon on foot       Social History   Substance Use Topics     Smoking status: Never Smoker     Smokeless tobacco: Never Used     Alcohol use Not on file      Comment: 1 drink occasonaly      Family History   Problem Relation Age of Onset     Hypertension Mother      Hyperlipidemia Mother      Coronary Artery Disease Mother      Hypertension Father      Hyperlipidemia Father      Hypertension Brother      Hypertension Brother      Hypertension Brother          Current Outpatient Prescriptions   Medication Sig Dispense Refill     multivitamin, therapeutic with minerals (MULTI-VITAMIN) TABS tablet Take 1 tablet by mouth daily 100 tablet 3     magnesium oxide (MAG-OX) 400 (241.3 MG) MG tablet Take 1 tablet (400 mg) by mouth daily 60 tablet 3     Probiotic Product (ACIDOPHILUS PROBIOTIC BLEND) CAPS Take 1 each by mouth daily 90 capsule 3     omega 3 1000 MG CAPS Take 1 g by mouth daily 90 capsule 3     Allergies   Allergen Reactions     Sulfa Drugs Swelling     throat     Recent  "Labs   Lab Test  01/08/18   0933  11/29/17   0959   LDL   --   143*   HDL   --   52   TRIG   --   135   ALT  27   --    CR  0.66  0.58   GFRESTIMATED  >90  >90   GFRESTBLACK  >90  >90   POTASSIUM  4.3  4.0   TSH   --   1.27      BP Readings from Last 3 Encounters:   02/14/18 124/78   01/08/18 106/74   11/29/17 112/76    Wt Readings from Last 3 Encounters:   02/14/18 172 lb (78 kg)   01/08/18 181 lb (82.1 kg)   11/29/17 181 lb (82.1 kg)                    Reviewed and updated as needed this visit by clinical staff  Tobacco  Allergies       Reviewed and updated as needed this visit by Provider         ROS:  Constitutional, HEENT, cardiovascular, pulmonary, gi and gu systems are negative, except as otherwise noted.    OBJECTIVE:     /78 (BP Location: Left arm, Patient Position: Chair, Cuff Size: Adult Large)  Pulse 80  Temp 98.9  F (37.2  C) (Tympanic)  Resp 16  Ht 5' 4.5\" (1.638 m)  Wt 172 lb (78 kg)  SpO2 98%  BMI 29.07 kg/m2  Body mass index is 29.07 kg/(m^2).  GENERAL: alert and no distress but ill appearing  HENT: normal cephalic/atraumatic, both ears:dull, nose and mouth without ulcers or lesions, nasal mucosa edematous , rhinorrhea clear and oral mucous membranes moist, throat mildly erythematous without exudate  NECK: no adenopathy, no asymmetry, masses, or scars and thyroid normal to palpation  RESP: mild wheezing with bronchospasm on inspiration  CV: regular rate and rhythm, normal S1 S2, no S3 or S4, no murmur, click or rub, no peripheral edema and peripheral pulses strong  MS: no gross musculoskeletal defects noted, no edema  PSYCH: mentation appears normal, affect normal/bright    Results for orders placed or performed in visit on 02/14/18   Influenza A/B antigen   Result Value Ref Range    Influenza A/B Agn Specimen Nares     Influenza A Negative NEG^Negative    Influenza B Negative NEG^Negative         ASSESSMENT/PLAN:       1. Flu-like symptoms  - Influenza A/B antigen - negative    2. " Cough  Viral URI with cough  - guaiFENesin-codeine (ROBITUSSIN AC) 100-10 MG/5ML SOLN solution; Take 5-10 mLs by mouth every 6 hours as needed  Dispense: 180 mL; Refill: 0  - albuterol (PROAIR HFA/PROVENTIL HFA/VENTOLIN HFA) 108 (90 BASE) MCG/ACT Inhaler; Inhale 2 puffs into the lungs every 4 hours as needed  Dispense: 1 Inhaler; Refill: 0    FUTURE APPOINTMENTS:       - Follow-up visit if no improvement or any worsening    Deanna Shine NP  [facility]

## 2018-02-14 NOTE — MR AVS SNAPSHOT
After Visit Summary   2/14/2018    Maria A Saldaña    MRN: [id number]           Patient Information     Date Of Birth          1965        Visit Information        Provider Department      2/14/2018 4:15 PM Deanna Shine NP [facility]        Today's Diagnoses     Flu-like symptoms    -  1    Cough          Care Instructions      ASSESSMENT/PLAN:       1. Flu-like symptoms  - Influenza A/B antigen    2. Cough  Viral URI with cough  - guaiFENesin-codeine (ROBITUSSIN AC) 100-10 MG/5ML SOLN solution; Take 5-10 mLs by mouth every 6 hours as needed  Dispense: 180 mL; Refill: 0  - albuterol (PROAIR HFA/PROVENTIL HFA/VENTOLIN HFA) 108 (90 BASE) MCG/ACT Inhaler; Inhale 2 puffs into the lungs every 4 hours as needed  Dispense: 1 Inhaler; Refill: 0    FUTURE APPOINTMENTS:       - Follow-up visit if no improvement or any worsening    Deanna Shine NP  [facility]          Follow-ups after your visit        Who to contact     If you have questions or need follow up information about today's clinic visit or your schedule please contact [facility] directly at [phone].  Normal or non-critical lab and imaging results will be communicated to you by [employer], letter or phone within 4 business days after the clinic has received the results. If you do not hear from us within 7 days, please contact the clinic through [employer] or phone. If you have a critical or abnormal lab result, we will notify you by phone as soon as possible.  Submit refill requests through [employer] or call your pharmacy and they will forward the refill request to us. Please allow 3 business days for your refill to be completed.          Additional Information About Your Visit        [employer] Information     [employer] lets you send messages to your doctor, view your test results, renew your prescriptions, schedule appointments and more. To sign up, go to [email or web address] . Click  "on \"Log in\" on the left side of the screen, which will take you to the Welcome page. Then click on \"Sign up Now\" on the right side of the page.     You will be asked to enter the access code listed below, as well as some personal information. Please follow the directions to create your username and password.     Your access code is: [email or web address]  Expires: 2018  2:18 PM     Your access code will  in 90 days. If you need help or a new code, please call your [facility] clinic or [phone].        Care EveryWhere ID     This is your Care EveryWhere ID. This could be used by other organizations to access your [facility] medical records  [id number]        Your Vitals Were     Pulse Temperature Respirations Height Pulse Oximetry BMI (Body Mass Index)    80 98.9  F (37.2  C) (Tympanic) 16 5' 4.5\" (1.638 m) 98% 29.07 kg/m2       Blood Pressure from Last 3 Encounters:   18 124/78   18 106/74   17 112/76    Weight from Last 3 Encounters:   18 172 lb (78 kg)   18 181 lb (82.1 kg)   17 181 lb (82.1 kg)              We Performed the Following     Influenza A/B antigen          Today's Medication Changes          These changes are accurate as of 18  4:39 PM.  If you have any questions, ask your nurse or doctor.               Start taking these medicines.        Dose/Directions    albuterol 108 (90 BASE) MCG/ACT Inhaler   Commonly known as:  PROAIR HFA/PROVENTIL HFA/VENTOLIN HFA   Used for:  Cough   Started by:  Deanna Shine NP        Dose:  2 puff   Inhale 2 puffs into the lungs every 4 hours as needed   Quantity:  1 Inhaler   Refills:  0       guaiFENesin-codeine 100-10 MG/5ML Soln solution   Commonly known as:  ROBITUSSIN AC   Used for:  Cough   Started by:  Deanna Shine NP        Dose:  1-2 tsp.   Take 5-10 mLs by mouth every 6 hours as needed   Quantity:  180 mL   Refills:  0            Where to get your medicines      These medications were sent to " [id number] - [place], [place] - [address] [place] [address]  [address] [address]     Phone:  [phone]     albuterol 108 (90 BASE) MCG/ACT Inhaler         Some of these will need a paper prescription and others can be bought over the counter.  Ask your nurse if you have questions.     Bring a paper prescription for each of these medications     guaiFENesin-codeine 100-10 MG/5ML Soln solution                Primary Care Provider Office Phone # Fax #    Deannaharlan Shine [phone] [address] [facility] [address]        Equal Access to Services     PARAM ARGUETA : Hadii ayesha vivas hadasho Soomaali, waaxda luqadaha, qaybta kaalmada adeegyada, lakesha bundy. So [facility] [phone].    ATENCIÓN: Si habla español, tiene a lopez disposición servicios gratuitos de asistencia lingüística. Llame al [phone].    We comply with applicable federal civil rights laws and [place] laws. We do not discriminate on the basis of race, color, national origin, age, disability, sex, sexual orientation, or gender identity.            Thank you!     Thank you for choosing [facility]  for your care. Our goal is always to provide you with excellent care. Hearing back from our patients is one way we can continue to improve our services. Please take a few minutes to complete the written survey that you may receive in the mail after your visit with us. Thank you!             Your Updated Medication List - Protect others around you: Learn how to safely use, store and throw away your medicines at www.disposemymeds.org.          This list is accurate as of 2/14/18  4:39 PM.  Always use your most recent med list.                   Brand Name Dispense Instructions for use Diagnosis    ACIDOPHILUS PROBIOTIC BLEND Caps     90 capsule    Take 1 each by mouth daily    Health care maintenance       albuterol 108 (90 BASE) MCG/ACT Inhaler    PROAIR  HFA/PROVENTIL HFA/VENTOLIN HFA    1 Inhaler    Inhale 2 puffs into the lungs every 4 hours as needed    Cough       guaiFENesin-codeine 100-10 MG/5ML Soln solution    ROBITUSSIN AC    180 mL    Take 5-10 mLs by mouth every 6 hours as needed    Cough       magnesium oxide 400 (241.3 MG) MG tablet    MAG-OX    60 tablet    Take 1 tablet (400 mg) by mouth daily    Health care maintenance       Multi-vitamin Tabs tablet     100 tablet    Take 1 tablet by mouth daily    Health care maintenance       omega 3 1000 MG Caps     90 capsule    Take 1 g by mouth daily    Health care maintenance

## 2018-02-14 NOTE — PATIENT INSTRUCTIONS
ASSESSMENT/PLAN:       1. Flu-like symptoms  - Influenza A/B antigen    2. Cough  Viral URI with cough  - guaiFENesin-codeine (ROBITUSSIN AC) 100-10 MG/5ML SOLN solution; Take 5-10 mLs by mouth every 6 hours as needed  Dispense: 180 mL; Refill: 0  - albuterol (PROAIR HFA/PROVENTIL HFA/VENTOLIN HFA) 108 (90 BASE) MCG/ACT Inhaler; Inhale 2 puffs into the lungs every 4 hours as needed  Dispense: 1 Inhaler; Refill: 0    FUTURE APPOINTMENTS:       - Follow-up visit if no improvement or any worsening    Deanna Shine NP  [facility]

## 2018-02-15 ASSESSMENT — ANXIETY QUESTIONNAIRES: GAD7 TOTAL SCORE: 0

## 2018-02-15 ASSESSMENT — PATIENT HEALTH QUESTIONNAIRE - PHQ9: SUM OF ALL RESPONSES TO PHQ QUESTIONS 1-9: 6

## 2018-04-25 ENCOUNTER — OFFICE VISIT (OUTPATIENT)
Dept: FAMILY MEDICINE | Facility: OTHER | Age: 53
End: 2018-04-25
Attending: NURSE PRACTITIONER
Payer: COMMERCIAL

## 2018-04-25 VITALS
HEIGHT: 65 IN | TEMPERATURE: 97.8 F | WEIGHT: 183.8 LBS | OXYGEN SATURATION: 97 % | BODY MASS INDEX: 30.62 KG/M2 | SYSTOLIC BLOOD PRESSURE: 130 MMHG | RESPIRATION RATE: 16 BRPM | HEART RATE: 71 BPM | DIASTOLIC BLOOD PRESSURE: 70 MMHG

## 2018-04-25 DIAGNOSIS — R07.0 THROAT PAIN: Primary | ICD-10-CM

## 2018-04-25 DIAGNOSIS — R06.83 SNORING: ICD-10-CM

## 2018-04-25 DIAGNOSIS — G47.30 SLEEP APNEA, UNSPECIFIED TYPE: ICD-10-CM

## 2018-04-25 DIAGNOSIS — J01.00 ACUTE MAXILLARY SINUSITIS, RECURRENCE NOT SPECIFIED: ICD-10-CM

## 2018-04-25 LAB
DEPRECATED S PYO AG THROAT QL EIA: NORMAL
DEPRECATED S PYO AG THROAT QL EIA: NORMAL
SPECIMEN SOURCE: NORMAL

## 2018-04-25 PROCEDURE — 99214 OFFICE O/P EST MOD 30 MIN: CPT | Performed by: NURSE PRACTITIONER

## 2018-04-25 PROCEDURE — 87880 STREP A ASSAY W/OPTIC: CPT | Performed by: NURSE PRACTITIONER

## 2018-04-25 PROCEDURE — 87081 CULTURE SCREEN ONLY: CPT | Performed by: NURSE PRACTITIONER

## 2018-04-25 ASSESSMENT — ANXIETY QUESTIONNAIRES
1. FEELING NERVOUS, ANXIOUS, OR ON EDGE: NOT AT ALL
IF YOU CHECKED OFF ANY PROBLEMS ON THIS QUESTIONNAIRE, HOW DIFFICULT HAVE THESE PROBLEMS MADE IT FOR YOU TO DO YOUR WORK, TAKE CARE OF THINGS AT HOME, OR GET ALONG WITH OTHER PEOPLE: NOT DIFFICULT AT ALL
2. NOT BEING ABLE TO STOP OR CONTROL WORRYING: NOT AT ALL
7. FEELING AFRAID AS IF SOMETHING AWFUL MIGHT HAPPEN: NOT AT ALL
3. WORRYING TOO MUCH ABOUT DIFFERENT THINGS: NOT AT ALL
5. BEING SO RESTLESS THAT IT IS HARD TO SIT STILL: NOT AT ALL
6. BECOMING EASILY ANNOYED OR IRRITABLE: NOT AT ALL
GAD7 TOTAL SCORE: 0

## 2018-04-25 ASSESSMENT — PATIENT HEALTH QUESTIONNAIRE - PHQ9: 5. POOR APPETITE OR OVEREATING: NOT AT ALL

## 2018-04-25 ASSESSMENT — PAIN SCALES - GENERAL: PAINLEVEL: MODERATE PAIN (5)

## 2018-04-25 NOTE — PROGRESS NOTES
SUBJECTIVE:                                                    Maria A Saldaña is a 52 year old female who presents to clinic today for the following health issues:      RESPIRATORY SYMPTOMS      Duration: 4 days    Description  nasal congestion, sore throat and facial pain/pressure    Severity: mild    Accompanying signs and symptoms: None    History (predisposing factors):  none    Precipitating or alleviating factors: None    Therapies tried and outcome:  rest and fluids OTC NSAids:    She also notes loud snoring and gasping herself awake.  Her  has told her she snores loudly and is getting worse.  She is sometimes tired during the day but feels she sleeps well.      Problem list and histories reviewed & adjusted, as indicated.  Additional history: as documented    There is no problem list on file for this patient.    Past Surgical History:   Procedure Laterality Date     COLONOSCOPY - HIM SCAN  04/13/2012     GYN SURGERY  1989    c section     SOFT TISSUE SURGERY      bunnon on foot       Social History   Substance Use Topics     Smoking status: Never Smoker     Smokeless tobacco: Never Used     Alcohol use Not on file      Comment: 1 drink occasonaly      Family History   Problem Relation Age of Onset     Hypertension Mother      Hyperlipidemia Mother      Coronary Artery Disease Mother      Hypertension Father      Hyperlipidemia Father      Hypertension Brother      Hypertension Brother      Hypertension Brother          Current Outpatient Prescriptions   Medication Sig Dispense Refill     magnesium oxide (MAG-OX) 400 (241.3 MG) MG tablet Take 1 tablet (400 mg) by mouth daily 60 tablet 3     multivitamin, therapeutic with minerals (MULTI-VITAMIN) TABS tablet Take 1 tablet by mouth daily 100 tablet 3     omega 3 1000 MG CAPS Take 1 g by mouth daily 90 capsule 3     Probiotic Product (ACIDOPHILUS PROBIOTIC BLEND) CAPS Take 1 each by mouth daily 90 capsule 3     Allergies   Allergen Reactions     Sulfa  "Drugs Swelling     throat     Recent Labs   Lab Test  01/08/18   0933  11/29/17   0959   LDL   --   143*   HDL   --   52   TRIG   --   135   ALT  27   --    CR  0.66  0.58   GFRESTIMATED  >90  >90   GFRESTBLACK  >90  >90   POTASSIUM  4.3  4.0   TSH   --   1.27      BP Readings from Last 3 Encounters:   04/25/18 130/70   02/14/18 124/78   01/08/18 106/74    Wt Readings from Last 3 Encounters:   04/25/18 183 lb 12.8 oz (83.4 kg)   02/14/18 172 lb (78 kg)   01/08/18 181 lb (82.1 kg)                    ROS:  Constitutional, HEENT, cardiovascular, pulmonary, gi and gu systems are negative, except as otherwise noted.    OBJECTIVE:     /70 (BP Location: Left arm, Patient Position: Sitting, Cuff Size: Adult Regular)  Pulse 71  Temp 97.8  F (36.6  C) (Tympanic)  Resp 16  Ht 5' 4.5\" (1.638 m)  Wt 183 lb 12.8 oz (83.4 kg)  SpO2 97%  BMI 31.06 kg/m2  Body mass index is 31.06 kg/(m^2).  GENERAL: healthy, alert and no distress  HENT: normal cephalic/atraumatic, both ears: dull, nose and mouth without ulcers or lesions, nasal mucosa edematous  and rhinorrhea thick/purulent, throat mildly erythematous without exudate but thick post nasal drip.   NECK: no adenopathy, no asymmetry, masses, or scars and thyroid normal to palpation  RESP: lungs clear to auscultation - no rales, rhonchi or wheezes  CV: regular rate and rhythm, normal S1 S2, no S3 or S4, no murmur, click or rub, no peripheral edema and peripheral pulses strong  MS: no gross musculoskeletal defects noted, no edema  NEURO: Normal strength and tone, mentation intact and speech normal  PSYCH: mentation appears normal, affect normal/bright    Results for orders placed or performed in visit on 04/25/18   Rapid strep screen   Result Value Ref Range    Specimen Description Throat     Rapid Strep A Screen       NEGATIVE: No Group A streptococcal antigen detected by immunoassay, await culture report.    Rapid Strep A Screen Internal QC OK          ASSESSMENT/PLAN: "       1. Throat pain  - Rapid strep screen - negative  - Beta strep group A culture - pending    2. Acute maxillary sinusitis, recurrence not specified  symptomatic  - amoxicillin-clavulanate (AUGMENTIN) 875-125 MG per tablet; Take 1 tablet by mouth 2 times daily for 10 days  Dispense: 20 tablet; Refill: 0    3. Snoring  - SLEEP EVALUATION & MANAGEMENT REFERRAL - ADULT -Other (Respond in commments) ([facility]); Future    4. Sleep apnea, unspecified type  - SLEEP EVALUATION & MANAGEMENT REFERRAL - ADULT -Other (Respond in commments) ([facility]); Future      FUTURE APPOINTMENTS:       - Follow-up visit if no improvement or any worsening.     Deanna Shine, NP  [facility]

## 2018-04-25 NOTE — MR AVS SNAPSHOT
After Visit Summary   4/25/2018    Maria A Saldaña    MRN: [id number]           Patient Information     Date Of Birth          1965        Visit Information        Provider Department      4/25/2018 3:30 PM Deanna Shine NP [facility]        Today's Diagnoses     Throat pain    -  1    Acute maxillary sinusitis, recurrence not specified        Snoring        Sleep apnea, unspecified type          Care Instructions            ASSESSMENT/PLAN:       1. Throat pain  - Rapid strep screen - negative  - Beta strep group A culture - pending    2. Acute maxillary sinusitis, recurrence not specified  symptomatic  - amoxicillin-clavulanate (AUGMENTIN) 875-125 MG per tablet; Take 1 tablet by mouth 2 times daily for 10 days  Dispense: 20 tablet; Refill: 0    3. Snoring  - SLEEP EVALUATION & MANAGEMENT REFERRAL - ADULT -Other (Respond in commments) ([facility]); Future    4. Sleep apnea, unspecified type  - SLEEP EVALUATION & MANAGEMENT REFERRAL - ADULT -Other (Respond in commments) ([facility]); Future      FUTURE APPOINTMENTS:       - Follow-up visit if no improvement or any worsening.     Deanna Shine NP  [facility]              Follow-ups after your visit        Additional Services     SLEEP EVALUATION & MANAGEMENT REFERRAL - ADULT -Other (Respond in commments) ([facility])       Please be aware that coverage of these services is subject to the terms and limitations of your health insurance plan.  Call member services at your health plan with any benefit or coverage questions.      Please bring the following to your appointment:    >>   List of current medications   >>   This referral request   >>   Any documents/labs given to you for this referral                      Future tests that were ordered for you today     Open Future Orders        Priority Expected Expires Ordered    SLEEP EVALUATION & MANAGEMENT REFERRAL - ADULT -Other (Respond in commments) ([facility] Routine  2019            Who to contact     If you have questions or need follow up information about today's clinic visit or your schedule please contact [facility] directly at [phone].  Normal or non-critical lab and imaging results will be communicated to you by MyChart, letter or phone within 4 business days after the clinic has received the results. If you do not hear from us within 7 days, please contact the clinic through [email or web address] or phone. If you have a critical or abnormal lab result, we will notify you by phone as soon as possible.  Submit refill requests through [employer] or call your pharmacy and they will forward the refill request to us. Please allow 3 business days for your refill to be completed.          Additional Information About Your Visit        [email or web address] [employer]vant[employer] Collar Information     Fiberstar lets you send messages to your doctor, view your test results, renew your prescriptions, schedule appointments and more. To sign up, go to [email or web address] . Click on \"Log in\" on the left side of the screen, which will take you to the Welcome page. Then click on \"Sign up Now\" on the right side of the page.     You will be asked to enter the access code listed below, as well as some personal information. Please follow the directions to create your username and password.     Your access code is: [email or web address]  Expires: 2018  3:51 PM     Your access code will  in 90 days. If you need help or a new code, please call your [facility] or [phone].        Care EveryWhere ID     This is your Care EveryWhere ID. This could be used by other organizations to access your [facility] medical records  [id number]        Your Vitals Were     Pulse Temperature Respirations Height Pulse Oximetry BMI (Body Mass Index)    71 97.8  F (36.6  C) (Tympanic) 16 5' 4.5\" (1.638 m) 97% 31.06 kg/m2       Blood Pressure from Last 3 Encounters:   18 130/70   18 124/78   18 " 106/74    Weight from Last 3 Encounters:   04/25/18 183 lb 12.8 oz (83.4 kg)   02/14/18 172 lb (78 kg)   01/08/18 181 lb (82.1 kg)              We Performed the Following     Beta strep group A culture     Rapid strep screen          Today's Medication Changes          These changes are accurate as of 4/25/18  3:52 PM.  If you have any questions, ask your nurse or doctor.               Start taking these medicines.        Dose/Directions    amoxicillin-clavulanate 875-125 MG per tablet   Commonly known as:  AUGMENTIN   Used for:  Acute maxillary sinusitis, recurrence not specified   Started by:  Deanna Shine NP        Dose:  1 tablet   Take 1 tablet by mouth 2 times daily for 10 days   Quantity:  20 tablet   Refills:  0            Where to get your medicines      These medications were sent to [place] [address] [place] [address] [address]     Phone:  [phone]     amoxicillin-clavulanate 875-125 MG per tablet                Primary Care Provider Office Phone # Fax #    Deanna Shine [phone] [address] [facility] [address]        Equal Access to Services     [facility]: Hadii ayesha vivas hadasho Soomaali, waaxda luqadaha, qaybta kaalmada adeegyada, lakesha bundy. So [facility] [phone].    ATENCIÓN: Si habla español, tiene a lopez disposición servicios gratuitos de asistencia lingüística. Llame al [phone].    We comply with applicable federal civil rights laws and [place] laws. We do not discriminate on the basis of race, color, national origin, age, disability, sex, sexual orientation, or gender identity.            Thank you!     Thank you for choosing [facility]  for your care. Our goal is always to provide you with excellent care. Hearing back from our patients is one way we can continue to improve our services. Please take a few minutes to complete  the written survey that you may receive in the mail after your visit with us. Thank you!             Your Updated Medication List - Protect others around you: Learn how to safely use, store and throw away your medicines at www.disposemymeds.org.          This list is accurate as of 4/25/18  3:52 PM.  Always use your most recent med list.                   Brand Name Dispense Instructions for use Diagnosis    ACIDOPHILUS PROBIOTIC BLEND Caps     90 capsule    Take 1 each by mouth daily    Health care maintenance       amoxicillin-clavulanate 875-125 MG per tablet    AUGMENTIN    20 tablet    Take 1 tablet by mouth 2 times daily for 10 days    Acute maxillary sinusitis, recurrence not specified       magnesium oxide 400 (241.3 Mg) MG tablet    MAG-OX    60 tablet    Take 1 tablet (400 mg) by mouth daily    Health care maintenance       Multi-vitamin Tabs tablet     100 tablet    Take 1 tablet by mouth daily    Health care maintenance       omega 3 1000 MG Caps     90 capsule    Take 1 g by mouth daily    Health care maintenance

## 2018-04-25 NOTE — PATIENT INSTRUCTIONS
ASSESSMENT/PLAN:       1. Throat pain  - Rapid strep screen - negative  - Beta strep group A culture - pending    2. Acute maxillary sinusitis, recurrence not specified  symptomatic  - amoxicillin-clavulanate (AUGMENTIN) 875-125 MG per tablet; Take 1 tablet by mouth 2 times daily for 10 days  Dispense: 20 tablet; Refill: 0    3. Snoring  - SLEEP EVALUATION & MANAGEMENT REFERRAL - ADULT -Other (Respond in commments) ([facility]); Future    4. Sleep apnea, unspecified type  - SLEEP EVALUATION & MANAGEMENT REFERRAL - ADULT -Other (Respond in commments) ([facility]); Future      FUTURE APPOINTMENTS:       - Follow-up visit if no improvement or any worsening.     Deanna Shine, NP  [facility] [place]

## 2018-04-26 ASSESSMENT — PATIENT HEALTH QUESTIONNAIRE - PHQ9: SUM OF ALL RESPONSES TO PHQ QUESTIONS 1-9: 0

## 2018-04-26 ASSESSMENT — ANXIETY QUESTIONNAIRES: GAD7 TOTAL SCORE: 0

## 2018-04-27 LAB
BACTERIA SPEC CULT: NORMAL
Lab: NORMAL
SPECIMEN SOURCE: NORMAL

## 2018-05-01 ENCOUNTER — OFFICE VISIT (OUTPATIENT)
Dept: SLEEP MEDICINE | Facility: HOSPITAL | Age: 53
End: 2018-05-01
Attending: NURSE PRACTITIONER
Payer: COMMERCIAL

## 2018-05-01 VITALS
WEIGHT: 180 LBS | SYSTOLIC BLOOD PRESSURE: 120 MMHG | OXYGEN SATURATION: 99 % | HEART RATE: 63 BPM | BODY MASS INDEX: 29.99 KG/M2 | RESPIRATION RATE: 12 BRPM | HEIGHT: 65 IN | DIASTOLIC BLOOD PRESSURE: 78 MMHG

## 2018-05-01 DIAGNOSIS — G47.33 OBSTRUCTIVE SLEEP APNEA SYNDROME: Primary | ICD-10-CM

## 2018-05-01 DIAGNOSIS — G47.30 SLEEP APNEA, UNSPECIFIED TYPE: ICD-10-CM

## 2018-05-01 DIAGNOSIS — R06.83 SNORING: ICD-10-CM

## 2018-05-01 PROCEDURE — G0463 HOSPITAL OUTPT CLINIC VISIT: HCPCS

## 2018-05-01 PROCEDURE — 99212 OFFICE O/P EST SF 10 MIN: CPT | Performed by: INTERNAL MEDICINE

## 2018-05-01 NOTE — PROGRESS NOTES
"53 y/o referred for possible sleep apnea. Pt snores loudly and occasionally gasps. No clear apneas. Sleep hours are 930 to 630, frequent wake ups. No AM headaches,no restless legs. She will fall asleep when she reclines during the day, but doesn't find this too much of a problem.     PMH  Low back pain    SH , works in front of a computer    PE  /78  Pulse 63  Resp 12  Ht 5' 4.5\" (1.638 m)  Wt 180 lb (81.6 kg)  SpO2 99%  BMI 30.42 kg/m2             Heent full ant neck, narrowed pharynx      Current Outpatient Prescriptions:      amoxicillin-clavulanate (AUGMENTIN) 875-125 MG per tablet, Take 1 tablet by mouth 2 times daily for 10 days, Disp: 20 tablet, Rfl: 0     magnesium oxide (MAG-OX) 400 (241.3 MG) MG tablet, Take 1 tablet (400 mg) by mouth daily, Disp: 60 tablet, Rfl: 3     multivitamin, therapeutic with minerals (MULTI-VITAMIN) TABS tablet, Take 1 tablet by mouth daily, Disp: 100 tablet, Rfl: 3     omega 3 1000 MG CAPS, Take 1 g by mouth daily, Disp: 90 capsule, Rfl: 3     Probiotic Product (ACIDOPHILUS PROBIOTIC BLEND) CAPS, Take 1 each by mouth daily, Disp: 90 capsule, Rfl: 3     A/ AJITH  Home study.   "

## 2018-05-01 NOTE — NURSING NOTE
Patient ID checked with name and date of birth. Reviewed allergies and home medications. Took Vitals and brief medical  history.   A home sleep test was ordered

## 2018-05-01 NOTE — MR AVS SNAPSHOT
"              After Visit Summary   5/1/2018    Maria A Saldaña    MRN: [id number]           Patient Information     Date Of Birth          1965        Visit Information        Provider Department      5/1/2018 9:00 AM Isaias Lancaster MD [facility]        Today's Diagnoses     Obstructive sleep apnea syndrome    -  1    Snoring        Sleep apnea, unspecified type           Follow-ups after your visit        Future tests that were ordered for you today     Open Future Orders        Priority Expected Expires Ordered    HST-Home Sleep Apnea Test Routine  10/31/2018 5/1/2018            Who to contact     If you have questions or need follow up information about today's clinic visit or your schedule please contact [facility] directly at [phone].  Normal or non-critical lab and imaging results will be communicated to you by MyChart, letter or phone within 4 business days after the clinic has received the results. If you do not hear from us within 7 days, please contact the clinic through RLX Technologieshart or phone. If you have a critical or abnormal lab result, we will notify you by phone as soon as possible.  Submit refill requests through IQMax or call your pharmacy and they will forward the refill request to us. Please allow 3 business days for your refill to be completed.          Additional Information About Your Visit        MyChart Information     IQMax lets you send messages to your doctor, view your test results, renew your prescriptions, schedule appointments and more. To sign up, go to [email or web address] . Click on \"Log in\" on the left side of the screen, which will take you to the Welcome page. Then click on \"Sign up Now\" on the right side of the page.     You will be asked to enter the access code listed below, as well as some personal information. Please follow the directions to create your username and password.     Your access code is: [email or web address]  Expires: 7/24/2018  3:51 PM     Your " "access code will  in 90 days. If you need help or a new code, please call your [facility] clinic or [phone].        Care EveryWhere ID     This is your Care EveryWhere ID. This could be used by other organizations to access your [facility] medical records  [id number]        Your Vitals Were     Pulse Respirations Height Pulse Oximetry BMI (Body Mass Index)       63 12 5' 4.5\" (1.638 m) 99% 30.42 kg/m2        Blood Pressure from Last 3 Encounters:   18 120/78   18 130/70   18 124/78    Weight from Last 3 Encounters:   18 180 lb (81.6 kg)   18 183 lb 12.8 oz (83.4 kg)   18 172 lb (78 kg)              We Performed the Following     SLEEP EVALUATION & MANAGEMENT REFERRAL - ADULT -Other (Respond in commments) ([facility])        Primary Care Provider Office Phone # Fax #    Deanna Shine [phone] [address] [facility] [address]        Equal Access to Services     [facility]: Hadii aad ku hadasho Soomaali, waaxda luqadaha, qaybta kaalmada adeegyada, lakesha mehta . So [facility] [phone].    ATENCIÓN: Si habla español, tiene a lopez disposición servicios gratuitos de asistencia lingüística. [facility] [phone].    We comply with applicable federal civil rights laws and [place] laws. We do not discriminate on the basis of race, color, national origin, age, disability, sex, sexual orientation, or gender identity.            Thank you!     Thank you for choosing [facility]  for your care. Our goal is always to provide you with excellent care. Hearing back from our patients is one way we can continue to improve our services. Please take a few minutes to complete the written survey that you may receive in the mail after your visit with us. Thank you!             Your Updated Medication List - Protect others around you: Learn how to safely use, store and throw away your medicines at " www.disposemymeds.org.          This list is accurate as of 5/1/18  9:16 AM.  Always use your most recent med list.                   Brand Name Dispense Instructions for use Diagnosis    ACIDOPHILUS PROBIOTIC BLEND Caps     90 capsule    Take 1 each by mouth daily    Health care maintenance       amoxicillin-clavulanate 875-125 MG per tablet    AUGMENTIN    20 tablet    Take 1 tablet by mouth 2 times daily for 10 days    Acute maxillary sinusitis, recurrence not specified       magnesium oxide 400 (241.3 Mg) MG tablet    MAG-OX    60 tablet    Take 1 tablet (400 mg) by mouth daily    Health care maintenance       Multi-vitamin Tabs tablet     100 tablet    Take 1 tablet by mouth daily    Health care maintenance       omega 3 1000 MG Caps     90 capsule    Take 1 g by mouth daily    Health care maintenance

## 2018-06-19 ENCOUNTER — OFFICE VISIT (OUTPATIENT)
Dept: SLEEP MEDICINE | Facility: HOSPITAL | Age: 53
End: 2018-06-19
Attending: INTERNAL MEDICINE
Payer: COMMERCIAL

## 2018-06-19 DIAGNOSIS — G47.33 OSA (OBSTRUCTIVE SLEEP APNEA): Primary | ICD-10-CM

## 2018-06-19 NOTE — MR AVS SNAPSHOT
"              After Visit Summary   6/19/2018    Maria A Saldaña    MRN: [id number]           Patient Information     Date Of Birth          1965        Visit Information        Provider Department      6/19/2018 3:15 PM [facility]        Today's Diagnoses     AJITH (obstructive sleep apnea)    -  1       Follow-ups after your visit        Your next 10 appointments already scheduled     Jun 19, 2018  3:15 PM CDT   HST  with [facility]   [facility] ([facility] )    [address] [address] [phone]            Jun 20, 2018  9:30 AM CDT   HST Drop Off with [facility]   [facility] ([facility] )    [address] [address]   [phone]              Who to contact     If you have questions or need follow up information about today's clinic visit or your schedule please contact [facility] directly at [phone].  Normal or non-critical lab and imaging results will be communicated to you by [employer] and Gr[employer]hart, letter or phone within 4 business days after the clinic has received the results. If you do not hear from us within 7 days, please contact the clinic through [employer] or phone. If you have a critical or abnormal lab result, we will notify you by phone as soon as possible.  Submit refill requests through "[employer] or call your pharmacy and they will forward the refill request to us. Please allow 3 business days for your refill to be completed.          Additional Information About Your Visit        Contracts and Grants[employer]gage Information     "[employer] lets you send messages to your doctor, view your test results, renew your prescriptions, schedule appointments and more. To sign up, go to [email or web address] . Click on \"Log in\" on the left side of the screen, which will take you to the Welcome page. Then click on \"Sign up Now\" on the right side of the page.     You will be asked to enter the access code listed below, as well as some personal " information. Please follow the directions to create your username and password.     Your access code is: [email or web address]  Expires: 2018  3:51 PM     Your access code will  in 90 days. If you need help or a new code, please call your [place] clinic or [phone].        Care EveryWhere ID     This is your Care EveryWhere ID. This could be used by other organizations to access your [place] medical records  [id number]         Blood Pressure from Last 3 Encounters:   18 120/78   18 130/70   18 124/78    Weight from Last 3 Encounters:   18 180 lb (81.6 kg)   18 183 lb 12.8 oz (83.4 kg)   18 172 lb (78 kg)              Today, you had the following     No orders found for display       Primary Care Provider Office Phone # Fax #    Deanna SOTOMartina Shine [phone]       [address] [place] [address]        Equal Access to Services     [facility]: Hadii aad ku hadasho Soomaali, waaxda luqadaha, qaybta kaalmada adeegyada, lakesha mehta . So [facility] [phone].    ATENCIÓN: Si habla español, tiene a lopez disposición servicios gratuitos de asistencia lingüística. [facility] [phone].    We comply with applicable federal civil rights laws and [place] laws. We do not discriminate on the basis of race, color, national origin, age, disability, sex, sexual orientation, or gender identity.            Thank you!     Thank you for choosing [facility]  for your care. Our goal is always to provide you with excellent care. Hearing back from our patients is one way we can continue to improve our services. Please take a few minutes to complete the written survey that you may receive in the mail after your visit with us. Thank you!             Your Updated Medication List - Protect others around you: Learn how to safely use, store and throw away your medicines at www.disposemymeds.org.          This list is accurate  as of 6/19/18  1:59 PM.  Always use your most recent med list.                   Brand Name Dispense Instructions for use Diagnosis    ACIDOPHILUS PROBIOTIC BLEND Caps     90 capsule    Take 1 each by mouth daily    Health care maintenance       magnesium oxide 400 (241.3 Mg) MG tablet    MAG-OX    60 tablet    Take 1 tablet (400 mg) by mouth daily    Health care maintenance       Multi-vitamin Tabs tablet     100 tablet    Take 1 tablet by mouth daily    Health care maintenance       omega 3 1000 MG Caps     90 capsule    Take 1 g by mouth daily    Health care maintenance

## 2018-06-19 NOTE — NURSING NOTE
Patient came In to  the home sleep test and was instructed in how to use it. She understood the instructions and was give the tech number if she has questions.

## 2018-06-20 ENCOUNTER — DOCUMENTATION ONLY (OUTPATIENT)
Dept: SLEEP MEDICINE | Facility: HOSPITAL | Age: 53
End: 2018-06-20
Attending: INTERNAL MEDICINE
Payer: COMMERCIAL

## 2018-06-20 DIAGNOSIS — G47.33 OBSTRUCTIVE SLEEP APNEA SYNDROME: Primary | ICD-10-CM

## 2018-06-20 PROCEDURE — G0399 HOME SLEEP TEST/TYPE 3 PORTA: HCPCS

## 2018-06-20 PROCEDURE — G0399 HOME SLEEP TEST/TYPE 3 PORTA: HCPCS | Mod: 26 | Performed by: INTERNAL MEDICINE

## 2018-06-20 NOTE — PROGRESS NOTES
Patient returned the home sleep test and the data was downloaded. With good data quality it did show respiratory events with an index of 6.1 with a low SPO2 of 88%.  Patient tolerated test well and most of the events happened towards the end of the night.and the snoring was moderaste.

## 2018-06-20 NOTE — LETTER
Maria A Saldaña  [address] [facility] [address]    July 12, 2018       Dear Maria A      I recently read your sleep study, you do have sleep apnea stopping or slowing your breathing  about 10 times per hour.     This is likely disturbing your sleep and making you feel tired during the day. I think we should try you on  the CPAP mask , hopefully it will make you feel more rested during the day and may help protect you from future health problems.     I will ask our sleep lab staff to set up a trial of the mask, if you have any problems or concerns please give us a call.                                                                                          Sincerely,        Isaias Lancaster MD, D,[facility]           [address] [place] [address] [phone]

## 2018-07-12 NOTE — PROGRESS NOTES
PHYSICIAN INTERPRETATION   HOME SLEEP STUDY   Patient: Maria A Saldaña  MRN: [id number]  YOB: 1965  Study Date: 6/19/18   Referring Physician: Deanna Shine  Ordering Physician: Isaias Lancaster MD   Indications for Home Study: The patient snores loudly and has apneas as well as excessive daytime somnolence.  Data: A full night home sleep study was performed recording the standard physiologic parameters including body position, movement, nasal pressure, thermal oral airflow, chest and abdominal movements with respiratory inductance plethysmography, and oxygen saturation by pulse oximetry. Pulse rate was estimated by oximetry recording. This study was considered adequate based on > 4 hours of quality oximetry and respiratory recording.     Respiration:   Sleep Associated Hypoxemia  Baseline oxygen saturation was 94% and time spent below 89% saturation was 2 minutes. The lowest oxygen saturation was 88%.   Respiratory events - During the diagnostic portion of the study, the home study revealed a presence of 13 obstructive, 12 mixed and central apneas. There were 22 hypopneas resulting in a combined apnea/hypopnea index [AHI] of 6.1 events per hour.   EKG monitored and no arrhythmias were seen.    Assessment: AJITH    Recommendations:trial autoPap.      Isaias Lancaster, July 12, 2018   Diplomate, American Board of Internal Medicine, Sleep Medicine

## 2018-07-13 ENCOUNTER — OFFICE VISIT (OUTPATIENT)
Dept: FAMILY MEDICINE | Facility: OTHER | Age: 53
End: 2018-07-13
Attending: NURSE PRACTITIONER
Payer: COMMERCIAL

## 2018-07-13 VITALS
BODY MASS INDEX: 29.16 KG/M2 | HEIGHT: 65 IN | SYSTOLIC BLOOD PRESSURE: 120 MMHG | TEMPERATURE: 98.6 F | DIASTOLIC BLOOD PRESSURE: 70 MMHG | HEART RATE: 80 BPM | WEIGHT: 175 LBS | OXYGEN SATURATION: 97 %

## 2018-07-13 DIAGNOSIS — G89.29 CHRONIC BILATERAL LOW BACK PAIN WITH RIGHT-SIDED SCIATICA: ICD-10-CM

## 2018-07-13 DIAGNOSIS — M62.838 MUSCLE SPASM: ICD-10-CM

## 2018-07-13 DIAGNOSIS — M54.41 CHRONIC BILATERAL LOW BACK PAIN WITH RIGHT-SIDED SCIATICA: ICD-10-CM

## 2018-07-13 DIAGNOSIS — M54.2 CERVICALGIA: Primary | ICD-10-CM

## 2018-07-13 PROCEDURE — 99214 OFFICE O/P EST MOD 30 MIN: CPT | Performed by: NURSE PRACTITIONER

## 2018-07-13 RX ORDER — BACLOFEN 10 MG/1
5-10 TABLET ORAL 3 TIMES DAILY
Qty: 90 TABLET | Refills: 3 | Status: SHIPPED | OUTPATIENT
Start: 2018-07-13 | End: 2018-11-30

## 2018-07-13 RX ORDER — IBUPROFEN 800 MG/1
800 TABLET, FILM COATED ORAL 3 TIMES DAILY PRN
Qty: 90 TABLET | Refills: 3 | Status: SHIPPED | OUTPATIENT
Start: 2018-07-13 | End: 2019-01-29

## 2018-07-13 ASSESSMENT — PAIN SCALES - GENERAL: PAINLEVEL: NO PAIN (0)

## 2018-07-13 NOTE — NURSING NOTE
"Chief Complaint   Patient presents with     Neck Pain       Initial /70  Pulse 80  Temp 98.6  F (37  C)  Ht 5' 4.5\" (1.638 m)  Wt 175 lb (79.4 kg)  SpO2 97%  BMI 29.57 kg/m2 Estimated body mass index is 29.57 kg/(m^2) as calculated from the following:    Height as of this encounter: 5' 4.5\" (1.638 m).    Weight as of this encounter: 175 lb (79.4 kg).  Medication Reconciliation: complete    RONNI Cardozo    "

## 2018-07-13 NOTE — MR AVS SNAPSHOT
After Visit Summary   7/13/2018    Maria A Saldaña    MRN: [id number]           Patient Information     Date Of Birth          1965        Visit Information        Provider Department      7/13/2018 4:00 PM Deanna Shine NP [facility]        Today's Diagnoses     Cervicalgia    -  1    Chronic bilateral low back pain with right-sided sciatica        Muscle spasm          Care Instructions      ASSESSMENT/PLAN:       1. Cervicalgia  chronic  - MR Cervical Spine w/o Contrast; Future  - ibuprofen (ADVIL/MOTRIN) 800 MG tablet; Take 1 tablet (800 mg) by mouth 3 times daily as needed for moderate pain  Dispense: 90 tablet; Refill: 3    2. Chronic bilateral low back pain with right-sided sciatica  chronic  - MR Lumbar Spine w/o Contrast; Future  - ibuprofen (ADVIL/MOTRIN) 800 MG tablet; Take 1 tablet (800 mg) by mouth 3 times daily as needed for moderate pain  Dispense: 90 tablet; Refill: 3    3. Muscle spasm  symptomatic  - baclofen (LIORESAL) 10 MG tablet; Take 0.5-1 tablets (5-10 mg) by mouth 3 times daily  Dispense: 90 tablet; Refill: 3    FUTURE APPOINTMENTS:       - Follow-up visit if no improvement or any worsening     Deanna Shine NP  [facility]          Follow-ups after your visit        Future tests that were ordered for you today     Open Future Orders        Priority Expected Expires Ordered    MR Cervical Spine w/o Contrast Routine  7/13/2019 7/13/2018    MR Lumbar Spine w/o Contrast Routine  7/13/2019 7/13/2018            Who to contact     If you have questions or need follow up information about today's clinic visit or your schedule please contact [facility] directly at [phone].  Normal or non-critical lab and imaging results will be communicated to you by MyChart, letter or phone within 4 business days after the clinic has received the results. If you do not hear from us within 7 days, please contact the clinic  "through [employer] or phone. If you have a critical or abnormal lab result, we will notify you by phone as soon as possible.  Submit refill requests through [employer] or call your pharmacy and they will forward the refill request to us. Please allow 3 business days for your refill to be completed.          Additional Information About Your Visit        [employer] Information     [employer] lets you send messages to your doctor, view your test results, renew your prescriptions, schedule appointments and more. To sign up, go to [email or web address] . Click on \"Log in\" on the left side of the screen, which will take you to the Welcome page. Then click on \"Sign up Now\" on the right side of the page.     You will be asked to enter the access code listed below, as well as some personal information. Please follow the directions to create your username and password.     Your access code is: [email or web address]  Expires: 10/11/2018  3:45 PM     Your access code will  in 90 days. If you need help or a new code, please call your [facility] clinic or [phone].        Care EveryWhere ID     This is your Care EveryWhere ID. This could be used by other organizations to access your [facility] medical records  [id number]        Your Vitals Were     Pulse Temperature Height Pulse Oximetry BMI (Body Mass Index)       80 98.6  F (37  C) 5' 4.5\" (1.638 m) 97% 29.57 kg/m2        Blood Pressure from Last 3 Encounters:   18 120/70   18 120/78   18 130/70    Weight from Last 3 Encounters:   18 175 lb (79.4 kg)   18 180 lb (81.6 kg)   18 183 lb 12.8 oz (83.4 kg)                 Today's Medication Changes          These changes are accurate as of 18  4:32 PM.  If you have any questions, ask your nurse or doctor.               Start taking these medicines.        Dose/Directions    baclofen 10 MG tablet   Commonly known as:  LIORESAL   Used for:  Muscle spasm   Started by:  Deanna Shine NP        " Dose:  5-10 mg   Take 0.5-1 tablets (5-10 mg) by mouth 3 times daily   Quantity:  90 tablet   Refills:  3       ibuprofen 800 MG tablet   Commonly known as:  ADVIL/MOTRIN   Used for:  Cervicalgia, Chronic bilateral low back pain with right-sided sciatica   Started by:  Deanna Shine NP        Dose:  800 mg   Take 1 tablet (800 mg) by mouth 3 times daily as needed for moderate pain   Quantity:  90 tablet   Refills:  3            Where to get your medicines      These medications were sent to [place] [address] [place] [address] [address]     Phone:  [phone]     baclofen 10 MG tablet    ibuprofen 800 MG tablet                Primary Care Provider Office Phone # Fax #    Deanna Shine [phone] [address] [facility] [address]        Equal Access to Services     [facility] : Hadii ayesha vivas hadasho Soomaali, waaxda luqadaha, qaybta kaalmada adeegyada, lakesha mehta . So [facility] [phone].    ATENCIÓN: Si habla español, tiene a lopez disposición servicios gratuitos de asistencia lingüística. [facility] [phone].    We comply with applicable federal civil rights laws and [place] laws. We do not discriminate on the basis of race, color, national origin, age, disability, sex, sexual orientation, or gender identity.            Thank you!     Thank you for choosing [facility]  for your care. Our goal is always to provide you with excellent care. Hearing back from our patients is one way we can continue to improve our services. Please take a few minutes to complete the written survey that you may receive in the mail after your visit with us. Thank you!             Your Updated Medication List - Protect others around you: Learn how to safely use, store and throw away your medicines at www.disposemymeds.org.          This list is accurate as of 7/13/18  4:32 PM.   Always use your most recent med list.                   Brand Name Dispense Instructions for use Diagnosis    ACIDOPHILUS PROBIOTIC BLEND Caps     90 capsule    Take 1 each by mouth daily    Health care maintenance       baclofen 10 MG tablet    LIORESAL    90 tablet    Take 0.5-1 tablets (5-10 mg) by mouth 3 times daily    Muscle spasm       ibuprofen 800 MG tablet    ADVIL/MOTRIN    90 tablet    Take 1 tablet (800 mg) by mouth 3 times daily as needed for moderate pain    Cervicalgia, Chronic bilateral low back pain with right-sided sciatica       magnesium oxide 400 (241.3 Mg) MG tablet    MAG-OX    60 tablet    Take 1 tablet (400 mg) by mouth daily    Health care maintenance       Multi-vitamin Tabs tablet     100 tablet    Take 1 tablet by mouth daily    Health care maintenance       omega 3 1000 MG Caps     90 capsule    Take 1 g by mouth daily    Health care maintenance

## 2018-07-13 NOTE — PATIENT INSTRUCTIONS
ASSESSMENT/PLAN:       1. Cervicalgia  chronic  - MR Cervical Spine w/o Contrast; Future  - ibuprofen (ADVIL/MOTRIN) 800 MG tablet; Take 1 tablet (800 mg) by mouth 3 times daily as needed for moderate pain  Dispense: 90 tablet; Refill: 3    2. Chronic bilateral low back pain with right-sided sciatica  chronic  - MR Lumbar Spine w/o Contrast; Future  - ibuprofen (ADVIL/MOTRIN) 800 MG tablet; Take 1 tablet (800 mg) by mouth 3 times daily as needed for moderate pain  Dispense: 90 tablet; Refill: 3    3. Muscle spasm  symptomatic  - baclofen (LIORESAL) 10 MG tablet; Take 0.5-1 tablets (5-10 mg) by mouth 3 times daily  Dispense: 90 tablet; Refill: 3    FUTURE APPOINTMENTS:       - Follow-up visit if no improvement or any worsening     Deanna Shine, NP  [facility]

## 2018-07-13 NOTE — PROGRESS NOTES
SUBJECTIVE:   Maria A Saldaña is a 52 year old female who presents to clinic today for the following health issues:    Neck Pain  Onset: Last month     Description:   Location: Right side of neck  Radiation: al the way down to right shoulder blade    Intensity: mild    Progression of Symptoms:  Improving constant    Accompanying Signs & Symptoms:  Burning, prickly sensation (paresthesias) in arm(s): YES- shoulder blade  Numbness in arm(s): no   Weakness in arm(s):  no   Fever: no   Headache: no   Nausea and/or vomiting: no     History:   Trauma: no   Previous neck pain: YES  Previous surgery or injections: no   Previous Imaging (MRI,X ray): no     Precipitating factors:   Does movement increase the pain:  YES    Alleviating factors:  Adjustment helps it    Therapies Tried and outcome:  Going to Chiropractor    Musculoskeletal problem/pain      Duration: Many years worsening    Description  Location: Right lower back    Intensity:  mild    Accompanying signs and symptoms: radiation of pain to right leg and tingling    Stiff,sharp,throbbing,dull pain constant,intermittent, usually self-limiting but this bout is getting worse.      History  Previous similar problem: YES- Ongoing  Previous evaluation:  none    Precipitating or alleviating factors:  Trauma or overuse: no   Aggravating factors include: sitting and leaning    Therapies tried and outcome: rest/inactivity, heat, ice, massage, stretching, exercises, support wrap, acetaminophen, Ibuprofen, chiropractor and physical therapy,no change      Problem list and histories reviewed & adjusted, as indicated.  Additional history: as documented    There is no problem list on file for this patient.    Past Surgical History:   Procedure Laterality Date     COLONOSCOPY - HIM SCAN  04/13/2012     GYN SURGERY  1989    c section     SOFT TISSUE SURGERY      bunnon on foot       Social History   Substance Use Topics     Smoking status: Never Smoker     Smokeless tobacco: Never  "Used     Alcohol use Not on file      Comment: 1 drink occasonaly      Family History   Problem Relation Age of Onset     Hypertension Mother      Hyperlipidemia Mother      Coronary Artery Disease Mother      Hypertension Father      Hyperlipidemia Father      Hypertension Brother      Hypertension Brother      Hypertension Brother          Current Outpatient Prescriptions   Medication Sig Dispense Refill     magnesium oxide (MAG-OX) 400 (241.3 MG) MG tablet Take 1 tablet (400 mg) by mouth daily 60 tablet 3     multivitamin, therapeutic with minerals (MULTI-VITAMIN) TABS tablet Take 1 tablet by mouth daily 100 tablet 3     omega 3 1000 MG CAPS Take 1 g by mouth daily 90 capsule 3     Probiotic Product (ACIDOPHILUS PROBIOTIC BLEND) CAPS Take 1 each by mouth daily 90 capsule 3     Allergies   Allergen Reactions     Sulfa Drugs Swelling     throat     Recent Labs   Lab Test  01/08/18   0933  11/29/17   0959   LDL   --   143*   HDL   --   52   TRIG   --   135   ALT  27   --    CR  0.66  0.58   GFRESTIMATED  >90  >90   GFRESTBLACK  >90  >90   POTASSIUM  4.3  4.0   TSH   --   1.27      BP Readings from Last 3 Encounters:   07/13/18 120/70   05/01/18 120/78   04/25/18 130/70    Wt Readings from Last 3 Encounters:   07/13/18 175 lb (79.4 kg)   05/01/18 180 lb (81.6 kg)   04/25/18 183 lb 12.8 oz (83.4 kg)                    Reviewed and updated as needed this visit by clinical staff       Reviewed and updated as needed this visit by Provider         ROS:  Constitutional, HEENT, cardiovascular, pulmonary, gi and gu systems are negative, except as otherwise noted.    OBJECTIVE:     /70  Pulse 80  Temp 98.6  F (37  C)  Ht 5' 4.5\" (1.638 m)  Wt 175 lb (79.4 kg)  SpO2 97%  BMI 29.57 kg/m2  Body mass index is 29.57 kg/(m^2).  GENERAL: healthy, alert and no distress  RESP: lungs clear to auscultation - no rales, rhonchi or wheezes  CV: regular rate and rhythm, normal S1 S2, no S3 or S4, no murmur, click or rub, no " peripheral edema and peripheral pulses strong  MS: extremities normal- no gross deformities noted, right trapezius is in spasm, tight and tender with palpation.  Low back is also tight, spasm and tender with palpation.  She is able to heel and toe walk, negative straight leg raise.    NEURO: Normal strength and tone, mentation intact and speech normal, patellar reflexes are normal  PSYCH: mentation appears normal, affect normal/bright        ASSESSMENT/PLAN:       1. Cervicalgia  chronic  - MR Cervical Spine w/o Contrast; Future  - ibuprofen (ADVIL/MOTRIN) 800 MG tablet; Take 1 tablet (800 mg) by mouth 3 times daily as needed for moderate pain  Dispense: 90 tablet; Refill: 3    2. Chronic bilateral low back pain with right-sided sciatica  chronic  - MR Lumbar Spine w/o Contrast; Future  - ibuprofen (ADVIL/MOTRIN) 800 MG tablet; Take 1 tablet (800 mg) by mouth 3 times daily as needed for moderate pain  Dispense: 90 tablet; Refill: 3    3. Muscle spasm  symptomatic  - baclofen (LIORESAL) 10 MG tablet; Take 0.5-1 tablets (5-10 mg) by mouth 3 times daily  Dispense: 90 tablet; Refill: 3    FUTURE APPOINTMENTS:       - Follow-up visit if no improvement or any worsening     Deanna Shine, NP  [facility]

## 2018-07-14 ASSESSMENT — PATIENT HEALTH QUESTIONNAIRE - PHQ9: SUM OF ALL RESPONSES TO PHQ QUESTIONS 1-9: 0

## 2018-07-17 ENCOUNTER — MYC MEDICAL ADVICE (OUTPATIENT)
Dept: FAMILY MEDICINE | Facility: OTHER | Age: 53
End: 2018-07-17

## 2018-07-17 DIAGNOSIS — M54.6 RIGHT-SIDED THORACIC BACK PAIN, UNSPECIFIED CHRONICITY: Primary | ICD-10-CM

## 2018-07-20 ENCOUNTER — TRANSFERRED RECORDS (OUTPATIENT)
Dept: HEALTH INFORMATION MANAGEMENT | Facility: CLINIC | Age: 53
End: 2018-07-20

## 2018-07-26 ENCOUNTER — MYC MEDICAL ADVICE (OUTPATIENT)
Dept: FAMILY MEDICINE | Facility: OTHER | Age: 53
End: 2018-07-26

## 2018-07-27 ENCOUNTER — TELEPHONE (OUTPATIENT)
Dept: FAMILY MEDICINE | Facility: OTHER | Age: 53
End: 2018-07-27

## 2018-07-27 DIAGNOSIS — M54.41 CHRONIC BILATERAL LOW BACK PAIN WITH RIGHT-SIDED SCIATICA: ICD-10-CM

## 2018-07-27 DIAGNOSIS — M62.838 MUSCLE SPASM: ICD-10-CM

## 2018-07-27 DIAGNOSIS — M54.2 CERVICALGIA: Primary | ICD-10-CM

## 2018-07-27 DIAGNOSIS — G89.29 CHRONIC BILATERAL LOW BACK PAIN WITH RIGHT-SIDED SCIATICA: ICD-10-CM

## 2018-08-03 ENCOUNTER — HOSPITAL ENCOUNTER (OUTPATIENT)
Dept: PHYSICAL THERAPY | Facility: OTHER | Age: 53
End: 2018-08-03
Attending: NURSE PRACTITIONER
Payer: COMMERCIAL

## 2018-08-03 PROCEDURE — 97140 MANUAL THERAPY 1/> REGIONS: CPT | Mod: GP

## 2018-08-03 PROCEDURE — 97161 PT EVAL LOW COMPLEX 20 MIN: CPT | Mod: GP

## 2018-08-03 NOTE — PROGRESS NOTES
08/03/18 0718   General Information   Type of Visit Initial OP Ortho PT Evaluation   Start of Care Date 08/03/18   Referring Physician Low Shine NP   Patient/Family Goals Statement Lessen pain and be able to turn head without pain   Orders Evaluate and Treat   Date of Order 07/30/18   Insurance Type [employer]   Medical Diagnosis Cervicalgia, Chronic LBP w R sciatica, Muscle spasm   Surgical/Medical history reviewed Yes   Precautions/Limitations no known precautions/limitations   Body Part(s)   Body Part(s) Cervical Spine   Presentation and Etiology   Pertinent history of current problem (include personal factors and/or comorbidities that impact the POC) Patient reports onset of right neck pain about a year ago.  She states the pain has become increased in intensity and frequency beginning this summer (2018 ).  She denies any known injuries.  She also has a history of low back pain for several years.  She has been seeing a chiropractor with her last appointment being 8/1/18 which does seem to help to some extent.  She did have MRIs to her cervical, thoracic, and lumbar spines recently.  At this time she would like to try a physical therapy.   Impairments A. Pain;D. Decreased ROM;E. Decreased flexibility   Functional Limitations perform activities of daily living;perform required work activities;perform desired leisure / sports activities   Symptom Location Right neck.(She also reports right low back pain to a lesser degree with occasional tingling into the right lower extremity and rare tingling into the right upper extremity)   How/Where did it occur From insidious onset   Chronicity Chronic   Pain rating (0-10 point scale) Best (/10);Worst (/10)   Best (/10) 2   Worst (/10) 8   Pain quality A. Sharp;C. Aching   Frequency of pain/symptoms A. Constant   Pain/symptoms are: The same all the time   Pain/symptoms exacerbated by G. Certain positions;H. Overhead reach;M. Other;C. Lifting   Pain exacerbation  "comment Reading, driving, sleeping, talking on phone without use of headset   Pain/symptoms eased by C. Rest;F. Certain positions   Progression of symptoms since onset: Improved   Current / Previous Interventions   Diagnostic Tests: MRI   MRI Results Results   MRI results See Chart (C-spine: Borderline/mild spinal stenosis at C6-7 and C7-T1 due to broad disc bulge at C6-7 and a focal herniated disc at C7-T1 to left foraminal narrowing at C5-6 and C6-7.  Thoracic spine: Minimal central disc herniation at T4-5 of doubtful clinical significance.  Lumbar spine: Right lateral disc bulge at L4-5 which probably touches the L4 nerve root to mild degenerative disc and endplate changes )   Current Level of Function   Patient role/employment history A. Employed   Employment Comments [employer]   Living environment [place]   Fall Risk Screen   Fall screen completed by PT   Have you fallen 2 or more times in the past year? No   Have you fallen and had an injury in the past year? No   Is patient a fall risk? No   System Outcome Measures   Outcome Measures (Neck Disabilty Index)   Cervical Spine   Observation No acute distress.  She does tend to turn her whole body versus isolated head motion   Posture Forward head rounded shoulder posture increased CT kyphosis   Cervical Flexion ROM 1 inch chin to chest with general neck pulling   Cervical Extension ROM 0-44    Cervical Right Side Bending ROM 0-13  with pain on right and a feeling like it is \"stuck \"   Cervical Left Side Bending ROM 0-15  with contralateral pull   Cervical Right Rotation ROM Less than 50% from full rotation with pain on right   Cervical Left Rotation ROM 75% rotation   Shoulder AROM Screen Within functional limits   Vertebral Artery Test Negative   Alar Ligament Test Negative   Transverse Ligament Test Negative   Spurling Test Negative   Cervical Distraction Test Negative-felt good   Neer Impingement Test Negative   Segmental Mobility-Cervical FRS " left C6   Palpation Significant soft tissue tension and tenderness to palpation along the right scalenes, upper trapezius, levator scapula, and cervical/upper thoracic paraspinal muscles.  Point tenderness on right C5-C6   Planned Therapy Interventions   Planned Therapy Interventions joint mobilization;manual therapy;ROM;strengthening;stretching   Planned Modality Interventions   Planned Modality Interventions Cryotherapy;Electrical stimulation;Ultrasound   Clinical Impression   Criteria for Skilled Therapeutic Interventions Met yes, treatment indicated   PT Diagnosis Right neck and back pain   Influenced by the following impairments Pain, decreased mobility and headaches   Functional limitations due to impairments Sleeping, recreational activities, driving, reading, lifting, certain positions, and talking on phone   Clinical Presentation Stable/Uncomplicated   Clinical Presentation Rationale No comorbidities influencing therapy plan   Clinical Decision Making (Complexity) Low complexity   Therapy Frequency 2 times/Week   Predicted Duration of Therapy Intervention (days/wks) 4 weeks   Risk & Benefits of therapy have been explained Yes   Patient, Family & other staff in agreement with plan of care Yes   Clinical Impression Comments Right neck pain with significant soft tissue restrictions and mild mechanical restrictions   Education Assessment   Barriers to Learning No barriers   ORTHO GOALS   PT Ortho Eval Goals 1;2;3   Ortho Goal 1   Goal Identifier STG 1   Goal Description Decreased pain when at its worst to a 7/10   Target Date 08/17/18   Ortho Goal 2   Goal Identifier LTG 1   Goal Description She will be able to read as much as she wants with slight to no neck pain   Target Date 08/31/18   Ortho Goal 3   Goal Identifier LTG 2   Goal Description She will demonstrate independence with home exercise program   Target Date 08/31/18   Total Evaluation Time   Total Evaluation Time 30

## 2018-08-08 ENCOUNTER — HOSPITAL ENCOUNTER (OUTPATIENT)
Dept: PHYSICAL THERAPY | Facility: OTHER | Age: 53
End: 2018-08-08
Attending: NURSE PRACTITIONER
Payer: COMMERCIAL

## 2018-08-08 PROCEDURE — 97140 MANUAL THERAPY 1/> REGIONS: CPT | Mod: GP

## 2018-08-10 ENCOUNTER — HOSPITAL ENCOUNTER (OUTPATIENT)
Dept: PHYSICAL THERAPY | Facility: OTHER | Age: 53
End: 2018-08-10
Attending: NURSE PRACTITIONER
Payer: COMMERCIAL

## 2018-08-10 PROCEDURE — 97140 MANUAL THERAPY 1/> REGIONS: CPT | Mod: GP

## 2018-08-21 ENCOUNTER — HOSPITAL ENCOUNTER (OUTPATIENT)
Dept: PHYSICAL THERAPY | Facility: OTHER | Age: 53
End: 2018-08-21
Attending: NURSE PRACTITIONER
Payer: COMMERCIAL

## 2018-08-21 PROCEDURE — 97140 MANUAL THERAPY 1/> REGIONS: CPT | Mod: GP

## 2018-08-23 ENCOUNTER — HOSPITAL ENCOUNTER (OUTPATIENT)
Dept: PHYSICAL THERAPY | Facility: OTHER | Age: 53
End: 2018-08-23
Attending: NURSE PRACTITIONER
Payer: COMMERCIAL

## 2018-08-23 PROCEDURE — 97140 MANUAL THERAPY 1/> REGIONS: CPT | Mod: GP

## 2018-08-29 ENCOUNTER — HOSPITAL ENCOUNTER (OUTPATIENT)
Dept: PHYSICAL THERAPY | Facility: OTHER | Age: 53
End: 2018-08-29
Attending: NURSE PRACTITIONER
Payer: COMMERCIAL

## 2018-08-29 PROCEDURE — 97140 MANUAL THERAPY 1/> REGIONS: CPT | Mod: GP

## 2018-08-31 ENCOUNTER — HOSPITAL ENCOUNTER (OUTPATIENT)
Dept: PHYSICAL THERAPY | Facility: OTHER | Age: 53
End: 2018-08-31
Attending: NURSE PRACTITIONER
Payer: COMMERCIAL

## 2018-08-31 PROCEDURE — 97140 MANUAL THERAPY 1/> REGIONS: CPT | Mod: GP

## 2018-08-31 PROCEDURE — 97110 THERAPEUTIC EXERCISES: CPT | Mod: GP

## 2018-09-05 ENCOUNTER — HOSPITAL ENCOUNTER (OUTPATIENT)
Dept: PHYSICAL THERAPY | Facility: OTHER | Age: 53
End: 2018-09-05
Attending: NURSE PRACTITIONER
Payer: COMMERCIAL

## 2018-09-05 PROCEDURE — 97140 MANUAL THERAPY 1/> REGIONS: CPT | Mod: GP

## 2018-09-05 PROCEDURE — 97110 THERAPEUTIC EXERCISES: CPT | Mod: GP

## 2018-09-07 ENCOUNTER — HOSPITAL ENCOUNTER (OUTPATIENT)
Dept: PHYSICAL THERAPY | Facility: OTHER | Age: 53
End: 2018-09-07
Attending: NURSE PRACTITIONER
Payer: COMMERCIAL

## 2018-09-07 PROCEDURE — 97140 MANUAL THERAPY 1/> REGIONS: CPT | Mod: GP

## 2018-09-12 ENCOUNTER — HOSPITAL ENCOUNTER (OUTPATIENT)
Dept: PHYSICAL THERAPY | Facility: OTHER | Age: 53
End: 2018-09-12
Attending: NURSE PRACTITIONER
Payer: COMMERCIAL

## 2018-09-12 PROCEDURE — 97140 MANUAL THERAPY 1/> REGIONS: CPT | Mod: GP

## 2018-09-26 NOTE — PROGRESS NOTES
Outpatient Physical Therapy Discharge Note     Patient: Maria A Saldaña  : 1965    Beginning/End Dates of Reporting Period:  18 to 2018    Referring Provider: Low Romeo NP    Therapy Diagnosis: Cervicalgia, Chronic LBP with R sciatica, muscle spasm     Client Self Report: She was seen [phone]. She reports significant reduction of neck and back pain and improved cervical spine mobility since beginning PT. She will still have especially AM stiffness in her neck.    Objective Measurements:  Objective Measure: C AROM  Details: rotation =  75 % to R and >75% to L  Objective Measure: Spinal mechanics  Details: No dysfxs found in cervical or lumbar spine  Objective Measure: Palpation  Details: mild remaining soft tissue tension today in the R posterior scalene mm                        Outcome Measures (most recent score):      Goals:  Goal Identifier STG 1   Goal Description Decreased pain when at its worst to a 7/10   Target Date 18   Date Met  18   Progress:     Goal Identifier LTG 1   Goal Description She will be able to read as much as she wants with slight to no neck pain   Target Date 18   Date Met  18   Progress:     Goal Identifier LTG 2   Goal Description She will demonstrate independence with home exercise program   Target Date 18   Date Met  18   Progress:     Goal Identifier     Goal Description     Target Date     Date Met      Progress:     Goal Identifier     Goal Description     Target Date     Date Met      Progress:     Goal Identifier     Goal Description     Target Date     Date Met      Progress:     Goal Identifier     Goal Description     Target Date     Date Met      Progress:     Goal Identifier     Goal Description     Target Date     Date Met      Progress:     Progress Toward Goals:   Progress this reporting period: PT goals met          Plan:  Discharge from therapy.    Discharge:    Reason for Discharge: Patient has met all  goals.    Equipment Issued: none    Discharge Plan: Patient to continue home program.

## 2018-11-30 ENCOUNTER — OFFICE VISIT (OUTPATIENT)
Dept: INTERNAL MEDICINE | Facility: OTHER | Age: 53
End: 2018-11-30
Attending: INTERNAL MEDICINE
Payer: COMMERCIAL

## 2018-11-30 VITALS
WEIGHT: 175 LBS | HEART RATE: 77 BPM | TEMPERATURE: 97.3 F | OXYGEN SATURATION: 98 % | SYSTOLIC BLOOD PRESSURE: 120 MMHG | BODY MASS INDEX: 29.57 KG/M2 | DIASTOLIC BLOOD PRESSURE: 82 MMHG

## 2018-11-30 DIAGNOSIS — Z53.9 NO SHOW: Primary | ICD-10-CM

## 2018-11-30 DIAGNOSIS — G47.33 OSA (OBSTRUCTIVE SLEEP APNEA): Primary | ICD-10-CM

## 2018-11-30 PROCEDURE — 99213 OFFICE O/P EST LOW 20 MIN: CPT | Performed by: INTERNAL MEDICINE

## 2018-11-30 ASSESSMENT — ANXIETY QUESTIONNAIRES
4. TROUBLE RELAXING: NOT AT ALL
3. WORRYING TOO MUCH ABOUT DIFFERENT THINGS: NOT AT ALL
5. BEING SO RESTLESS THAT IT IS HARD TO SIT STILL: NOT AT ALL
7. FEELING AFRAID AS IF SOMETHING AWFUL MIGHT HAPPEN: NOT AT ALL
GAD7 TOTAL SCORE: 0
2. NOT BEING ABLE TO STOP OR CONTROL WORRYING: NOT AT ALL
1. FEELING NERVOUS, ANXIOUS, OR ON EDGE: NOT AT ALL
6. BECOMING EASILY ANNOYED OR IRRITABLE: NOT AT ALL

## 2018-11-30 ASSESSMENT — PAIN SCALES - GENERAL: PAINLEVEL: NO PAIN (0)

## 2018-11-30 ASSESSMENT — PATIENT HEALTH QUESTIONNAIRE - PHQ9: SUM OF ALL RESPONSES TO PHQ QUESTIONS 1-9: 0

## 2018-11-30 NOTE — MR AVS SNAPSHOT
After Visit Summary   11/30/2018    Maria A Saldaña    MRN: [id number]           Patient Information     Date Of Birth          1965        Visit Information        Provider Department      11/30/2018 11:00 AM Yuval Murguia, DO [facility]         Follow-ups after your visit        Who to contact     If you have questions or need follow up information about today's clinic visit or your schedule please contact [facility] directly at [phone].  Normal or non-critical lab and imaging results will be communicated to you by MyChart, letter or phone within 4 business days after the clinic has received the results. If you do not hear from us within 7 days, please contact the clinic through [employer] or phone. If you have a critical or abnormal lab result, we will notify you by phone as soon as possible.  Submit refill requests through [employer] or call your pharmacy and they will forward the refill request to us. Please allow 3 business days for your refill to be completed.          Additional Information About Your Visit        MyChart Information     [employer] gives you secure access to your electronic health record. If you see a primary care provider, you can also send messages to your care team and make appointments. If you have questions, please call your primary care clinic.  If you do not have a primary care provider, please call [phone] and they will assist you.        Care EveryWhere ID     This is your Care EveryWhere ID. This could be used by other organizations to access your [place] medical records  [id number]        Your Vitals Were     Pulse Temperature Pulse Oximetry BMI (Body Mass Index)          77 97.3  F (36.3  C) (Tympanic) 98% 29.57 kg/m2         Blood Pressure from Last 3 Encounters:   11/30/18 120/82   07/13/18 120/70   05/01/18 120/78    Weight from Last 3 Encounters:   11/30/18 175 lb (79.4 kg)   07/13/18 175 lb (79.4  kg)   05/01/18 180 lb (81.6 kg)              Today, you had the following     No orders found for display         Today's Medication Changes          These changes are accurate as of 11/30/18 11:20 AM.  If you have any questions, ask your nurse or doctor.               Stop taking these medicines if you haven't already. Please contact your care team if you have questions.     ACIDOPHILUS PROBIOTIC BLEND Caps   Stopped by:  Yuval Murguia, DO           baclofen 10 MG tablet   Commonly known as:  LIORESAL   Stopped by:  Yuval Murguia, DO           magnesium oxide 400 (241.3 Mg) MG tablet   Commonly known as:  MAG-OX   Stopped by:  Yuval Murguia, DO           Multi-vitamin tablet   Stopped by:  Yuval Murguia, DO           omega 3 1000 MG Caps   Stopped by:  Yuval Murguia DO                    Primary Care Provider Office Phone # Fax #    Deanna SOTOMartina Shine [phone] [facility] [address]        Equal Access to Services     [facility]: Hadii aad ku hadasho Soomaali, waaxda luqadaha, qaybta kaalmada adeegyada, waxay idiin hayherbertn jennifer mehta . So [facility] [phone].    ATENCIÓN: Si habla español, tiene a lopez disposición servicios gratuitos de asistencia lingüística. Keyoname al [phone].    We comply with applicable federal civil rights laws and [place] laws. We do not discriminate on the basis of race, color, national origin, age, disability, sex, sexual orientation, or gender identity.            Thank you!     Thank you for choosing [facility]  for your care. Our goal is always to provide you with excellent care. Hearing back from our patients is one way we can continue to improve our services. Please take a few minutes to complete the written survey that you may receive in the mail after your visit with us. Thank you!             Your Updated Medication List - Protect others  around you: Learn how to safely use, store and throw away your medicines at www.disposemymeds.org.          This list is accurate as of 11/30/18 11:20 AM.  Always use your most recent med list.                   Brand Name Dispense Instructions for use Diagnosis    ibuprofen 800 MG tablet    ADVIL/MOTRIN    90 tablet    Take 1 tablet (800 mg) by mouth 3 times daily as needed for moderate pain    Cervicalgia, Chronic bilateral low back pain with right-sided sciatica

## 2018-11-30 NOTE — NURSING NOTE
CPAP Compliance Visit:       Name: Maria A Saldaña MRN# [id number]   Age: 53 year old YOB: 1965     Date of Consultation: November 30, 2018  Primary care provider: Deanna Shine    Compliance:   70 % of days with >4 hours of use.   3days/30 with no use   Average use: 5hours 20minutes per day    1.2 L/min   CPAP 95% pressure 9.9 cm   AHI 1.2 events/hour   CECILE 1  PB 0%     Impression:   Patient has been compliant with therapy but started having pain with the interface. And fell short of compliance lately. She is working with the sleep center and a new mask that is working better. She will continue to follow until she is compliant again. She stated today that the new mask is much better!

## 2018-11-30 NOTE — PROGRESS NOTES
Impression/Plan:   ResMed   Auto-PAP 5 - [id number] 30 day usage data:   70% of days with > 4 hours of use.    Average use 4 hours   CPAP 95% pressure 9.9cm.   AHI 1.2 events per hour.    Patient was fitted with new mask and feels this is working much better than thte previous mask.  She otherwise denies any complaints today.      EXAM:  Constitutional: healthy, alert and no distress   Cardiovascular:  RRR. No murmurs, clicks gallops or rub  Respiratory:  Lungs clear      Patient is 70% compliant.  Patient is currently compliant and benefiting from the use of C-pap.    Continue with C-pap therapy and follow up with sleep medicine if necessary.     Yuval Murguia, DO

## 2018-11-30 NOTE — PROGRESS NOTES
CPAP Compliance Visit:       Name: Maria A Saldaña MRN# [id number]   Age: 53 year old YOB: 1965     Date of Consultation: November 30, 2018  Primary care provider: Deanna Shine    Compliance:   70% of days with >4 hours of use.   10days/30 with no use   Average use: 5hours 20minutes per day    1.2 L/min   CPAP pressure 9.9 cm   AHI 1.2 events/hour   CECILE 1.0  PB 0%     Impression:   Patient has been compliant. Has developed pain with interface and is working with sleep center on different mask.

## 2018-11-30 NOTE — MR AVS SNAPSHOT
After Visit Summary   11/30/2018    Maria A Saldaña    MRN: [id number]           Patient Information     Date Of Birth          1965        Visit Information        Provider Department      11/30/2018 10:45 AM Yuval Murguia DO [facility]        Today's Diagnoses     NO SHOW    -  1       Follow-ups after your visit        Your next 10 appointments already scheduled     Nov 30, 2018 10:45 AM CST   (Arrive by 10:30 AM)   SHORT with Yuval Murguia DO   [facility] ([facility] )    [address] [place] [address]   [phone]              Who to contact     If you have questions or need follow up information about today's clinic visit or your schedule please contact [facility] directly at [phone].  Normal or non-critical lab and imaging results will be communicated to you by MyChart, letter or phone within 4 business days after the clinic has received the results. If you do not hear from us within 7 days, please contact the clinic through [employer] or phone. If you have a critical or abnormal lab result, we will notify you by phone as soon as possible.  Submit refill requests through [employer] or call your pharmacy and they will forward the refill request to us. Please allow 3 business days for your refill to be completed.          Additional Information About Your Visit        MyChart Information     [employer] gives you secure access to your electronic health record. If you see a primary care provider, you can also send messages to your care team and make appointments. If you have questions, please call your primary care clinic.  If you do not have a primary care provider, please call [phone] and they will assist you.        Care EveryWhere ID     This is your Care EveryWhere ID. This could be used by other organizations to access your [place] medical records  [id number]          Blood Pressure from Last 3 Encounters:   07/13/18 120/70   05/01/18 120/78   04/25/18 130/70    Weight from Last 3 Encounters:   07/13/18 175 lb (79.4 kg)   05/01/18 180 lb (81.6 kg)   04/25/18 183 lb 12.8 oz (83.4 kg)              Today, you had the following     No orders found for display       Primary Care Provider Office Phone # Fax #    Deanna UNDERWOOD SHIMA Shine [phone] [facility] [id number]        Equal Access to Services     [facility]: Hadii aad ku hadasho Soomaali, waaxda luqadaha, qaybta kaalmada adeegyada, lakesha mehta . So [facility] [phone].    ATENCIÓN: Si habla español, tiene a lopez disposición servicios gratuitos de asistencia lingüística. Llame al [phone].    We comply with applicable federal civil rights laws and [place] laws. We do not discriminate on the basis of race, color, national origin, age, disability, sex, sexual orientation, or gender identity.            Thank you!     Thank you for choosing [facility]  for your care. Our goal is always to provide you with excellent care. Hearing back from our patients is one way we can continue to improve our services. Please take a few minutes to complete the written survey that you may receive in the mail after your visit with us. Thank you!             Your Updated Medication List - Protect others around you: Learn how to safely use, store and throw away your medicines at www.disposemymeds.org.          This list is accurate as of 11/30/18 10:29 AM.  Always use your most recent med list.                   Brand Name Dispense Instructions for use Diagnosis    ACIDOPHILUS PROBIOTIC BLEND Caps     90 capsule    Take 1 each by mouth daily    Health care maintenance       baclofen 10 MG tablet    LIORESAL    90 tablet    Take 0.5-1 tablets (5-10 mg) by mouth 3 times daily    Muscle spasm       ibuprofen 800 MG tablet    ADVIL/MOTRIN    90  tablet    Take 1 tablet (800 mg) by mouth 3 times daily as needed for moderate pain    Cervicalgia, Chronic bilateral low back pain with right-sided sciatica       magnesium oxide 400 (241.3 Mg) MG tablet    MAG-OX    60 tablet    Take 1 tablet (400 mg) by mouth daily    Health care maintenance       Multi-vitamin tablet     100 tablet    Take 1 tablet by mouth daily    Health care maintenance       omega 3 1000 MG Caps     90 capsule    Take 1 g by mouth daily    Health care maintenance

## 2018-12-01 ASSESSMENT — ANXIETY QUESTIONNAIRES: GAD7 TOTAL SCORE: 0

## 2019-01-29 DIAGNOSIS — M54.2 CERVICALGIA: ICD-10-CM

## 2019-01-29 DIAGNOSIS — M54.41 CHRONIC BILATERAL LOW BACK PAIN WITH RIGHT-SIDED SCIATICA: ICD-10-CM

## 2019-01-29 DIAGNOSIS — G89.29 CHRONIC BILATERAL LOW BACK PAIN WITH RIGHT-SIDED SCIATICA: ICD-10-CM

## 2019-01-29 RX ORDER — IBUPROFEN 800 MG/1
800 TABLET, FILM COATED ORAL 3 TIMES DAILY PRN
Qty: 90 TABLET | Refills: 0 | Status: SHIPPED | OUTPATIENT
Start: 2019-01-29

## 2019-01-29 NOTE — TELEPHONE ENCOUNTER
Ibuprofen 800mg      Last Written Prescription Date:  7/13/2018  Last Fill Quantity: 90,   # refills: 3  Last Office Visit: 11/30/18  Future Office visit:

## 2019-03-12 DIAGNOSIS — G47.10 HYPERSOMNIA, UNSPECIFIED: ICD-10-CM

## 2019-03-12 DIAGNOSIS — G47.33 OBSTRUCTIVE SLEEP APNEA (ADULT) (PEDIATRIC): ICD-10-CM

## 2019-03-12 DIAGNOSIS — G47.10 PERSISTENT DISORDER OF INITIATING OR MAINTAINING WAKEFULNESS: Primary | ICD-10-CM

## 2019-05-20 ENCOUNTER — TRANSFERRED RECORDS (OUTPATIENT)
Dept: HEALTH INFORMATION MANAGEMENT | Facility: CLINIC | Age: 54
End: 2019-05-20

## 2019-05-29 ENCOUNTER — TRANSFERRED RECORDS (OUTPATIENT)
Dept: HEALTH INFORMATION MANAGEMENT | Facility: CLINIC | Age: 54
End: 2019-05-29

## 2019-06-03 ENCOUNTER — TRANSFERRED RECORDS (OUTPATIENT)
Dept: HEALTH INFORMATION MANAGEMENT | Facility: CLINIC | Age: 54
End: 2019-06-03

## 2019-06-14 ENCOUNTER — TRANSFERRED RECORDS (OUTPATIENT)
Dept: HEALTH INFORMATION MANAGEMENT | Facility: CLINIC | Age: 54
End: 2019-06-14

## 2020-02-13 ENCOUNTER — MEDICAL CORRESPONDENCE (OUTPATIENT)
Dept: HEALTH INFORMATION MANAGEMENT | Facility: CLINIC | Age: 55
End: 2020-02-13

## 2020-02-14 ENCOUNTER — MEDICAL CORRESPONDENCE (OUTPATIENT)
Dept: HEALTH INFORMATION MANAGEMENT | Facility: CLINIC | Age: 55
End: 2020-02-14

## 2020-02-27 ENCOUNTER — TRANSFERRED RECORDS (OUTPATIENT)
Dept: HEALTH INFORMATION MANAGEMENT | Facility: CLINIC | Age: 55
End: 2020-02-27

## 2020-03-02 ENCOUNTER — HEALTH MAINTENANCE LETTER (OUTPATIENT)
Age: 55
End: 2020-03-02

## 2020-06-08 ENCOUNTER — VIRTUAL VISIT (OUTPATIENT)
Dept: FAMILY MEDICINE | Facility: OTHER | Age: 55
End: 2020-06-08
Attending: NURSE PRACTITIONER
Payer: COMMERCIAL

## 2020-06-08 DIAGNOSIS — H10.33 ACUTE BACTERIAL CONJUNCTIVITIS OF BOTH EYES: Primary | ICD-10-CM

## 2020-06-08 PROCEDURE — 99213 OFFICE O/P EST LOW 20 MIN: CPT | Mod: 95 | Performed by: NURSE PRACTITIONER

## 2020-06-08 RX ORDER — CIPROFLOXACIN HYDROCHLORIDE 3.5 MG/ML
1-2 SOLUTION/ DROPS TOPICAL 4 TIMES DAILY
Qty: 1 BOTTLE | Refills: 0 | Status: SHIPPED | OUTPATIENT
Start: 2020-06-08 | End: 2020-06-15

## 2020-06-08 NOTE — PROGRESS NOTES
"Maria A Saldaña is a 54 year old female who is being evaluated via a billable video visit.      The patient has been notified of following:     \"This video visit will be conducted via a call between you and your physician/provider. We have found that certain health care needs can be provided without the need for an in-person physical exam.  This service lets us provide the care you need with a video conversation.  If a prescription is necessary we can send it directly to your pharmacy.  If lab work is needed we can place an order for that and you can then stop by our lab to have the test done at a later time.    Video visits are billed at different rates depending on your insurance coverage.  Please reach out to your insurance provider with any questions.    If during the course of the call the physician/provider feels a video visit is not appropriate, you will not be charged for this service.\"    Patient has given verbal consent for Video visit? Yes    How would you like to obtain your AVS? [facility]    Patient would like the video invitation sent by: Text to cell phone: [phone]    Will anyone else be joining your video visit? No      Subjective     Maria A Saldaña is a 54 year old female who presents today via video visit for the following health issues:    HPI  Eye(s) Problem      Duration: Since Friday    Description:  Location: bilateral  Pain: YES  Redness: YES  Discharge: YES    Accompanying signs and symptoms: Itching    History (Trauma, foreign body exposure,): None    Precipitating or alleviating factors (contact use): no contacts, last Friday she had a tint applied to her eyelashes - woke Saturday with itching, redness, thick crusty discharge that was worsening over the weekend.      Therapies tried and outcome: ice    She has been using antihistamine drop (OTC) twice daily for the itching and it has been  Helping.       Video Start Time: unable to connect via video - we then did a telephone encounter. " "         There is no problem list on file for this patient.    Past Surgical History:   Procedure Laterality Date     COLONOSCOPY - HIM SCAN  04/13/2012     GYN SURGERY  1989    c section     ORTHOPEDIC SURGERY  01/2018     SOFT TISSUE SURGERY      bunnon on foot       Social History     Tobacco Use     Smoking status: Never Smoker     Smokeless tobacco: Never Used   Substance Use Topics     Alcohol use: Not on file     Comment: 1 drink occasonaly      Family History   Problem Relation Age of Onset     Hypertension Mother      Hyperlipidemia Mother      Coronary Artery Disease Mother      Hypertension Father      Hyperlipidemia Father      Hypertension Brother      Hypertension Brother      Hypertension Brother          Current Outpatient Medications   Medication Sig Dispense Refill     ciprofloxacin (CILOXAN) 0.3 % ophthalmic solution Place 1-2 drops into both eyes 4 times daily for 7 days 1 Bottle 0     ibuprofen (ADVIL/MOTRIN) 800 MG tablet Take 1 tablet (800 mg) by mouth 3 times daily as needed for moderate pain 90 tablet 0     Allergies   Allergen Reactions     Sulfa Drugs Swelling     throat     Recent Labs   Lab Test 01/08/18  0933 11/29/17  0959   LDL  --  143*   HDL  --  52   TRIG  --  135   ALT 27  --    CR 0.66 0.58   GFRESTIMATED >90 >90   GFRESTBLACK >90 >90   POTASSIUM 4.3 4.0   TSH  --  1.27      BP Readings from Last 3 Encounters:   11/30/18 120/82   07/13/18 120/70   05/01/18 120/78    Wt Readings from Last 3 Encounters:   11/30/18 79.4 kg (175 lb)   07/13/18 79.4 kg (175 lb)   05/01/18 81.6 kg (180 lb)                    Reviewed and updated as needed this visit by Provider         Review of Systems   Constitutional, HEENT, cardiovascular, pulmonary, gi and gu systems are negative, except as otherwise noted.      Objective    There were no vitals taken for this visit.  Estimated body mass index is 29.57 kg/m  as calculated from the following:    Height as of 7/13/18: 1.638 m (5' 4.5\").    Weight as " of 11/30/18: 79.4 kg (175 lb).  Physical Exam     GENERAL: Healthy, alert and no distress  RESP: No audible wheeze, cough, or visible cyanosis.  No visible retractions or increased work of breathing.    PSYCH: Mentation appears normal, affect normal/bright, judgement and insight intact, normal speech and appearance well-groomed.              Assessment & Plan     1. Acute bacterial conjunctivitis of both eyes  Continue antihistamine drop but separate from antibiotic drop by at least 30-60 minutes.  Continue warm compress, follow-up if any worsening or no improvement, or if any vision changes are noted.   - ciprofloxacin (CILOXAN) 0.3 % ophthalmic solution; Place 1-2 drops into both eyes 4 times daily for 7 days  Dispense: 1 Bottle; Refill: 0    She is going to try to upload a picture for review.             Return if symptoms worsen or fail to improve.    Deanna Shine, NP  [facility]      Video-Visit Details    Type of service:  unable to complete, telephone encounter    Video End Time:NA    Originating Location (pt. Location): Home    Distant Location (provider location):  [facility]     Platform used for Video Visit: Unable to complete video visit    Return if symptoms worsen or fail to improve.       Telephone visit, 12 minutes.

## 2020-12-20 ENCOUNTER — HEALTH MAINTENANCE LETTER (OUTPATIENT)
Age: 55
End: 2020-12-20

## 2021-04-18 ENCOUNTER — HEALTH MAINTENANCE LETTER (OUTPATIENT)
Age: 56
End: 2021-04-18

## 2021-10-03 ENCOUNTER — HEALTH MAINTENANCE LETTER (OUTPATIENT)
Age: 56
End: 2021-10-03

## 2022-05-14 ENCOUNTER — HEALTH MAINTENANCE LETTER (OUTPATIENT)
Age: 57
End: 2022-05-14

## 2022-05-20 ENCOUNTER — TELEPHONE (OUTPATIENT)
Dept: SLEEP MEDICINE | Facility: CLINIC | Age: 57
End: 2022-05-20

## 2022-05-20 DIAGNOSIS — G47.33 OSA (OBSTRUCTIVE SLEEP APNEA): Primary | ICD-10-CM

## 2022-09-04 ENCOUNTER — HEALTH MAINTENANCE LETTER (OUTPATIENT)
Age: 57
End: 2022-09-04

## 2023-06-03 ENCOUNTER — HEALTH MAINTENANCE LETTER (OUTPATIENT)
Age: 58
End: 2023-06-03

## 2023-08-09 ENCOUNTER — TELEPHONE (OUTPATIENT)
Dept: FAMILY MEDICINE | Facility: OTHER | Age: 58
End: 2023-08-09